# Patient Record
Sex: MALE | Race: OTHER | Employment: OTHER | URBAN - METROPOLITAN AREA
[De-identification: names, ages, dates, MRNs, and addresses within clinical notes are randomized per-mention and may not be internally consistent; named-entity substitution may affect disease eponyms.]

---

## 2022-11-25 ENCOUNTER — HOSPITAL ENCOUNTER (EMERGENCY)
Facility: HOSPITAL | Age: 72
Discharge: HOME | End: 2022-11-25
Attending: EMERGENCY MEDICINE
Payer: MEDICARE

## 2022-11-25 VITALS
HEART RATE: 103 BPM | TEMPERATURE: 100 F | OXYGEN SATURATION: 97 % | WEIGHT: 150 LBS | DIASTOLIC BLOOD PRESSURE: 80 MMHG | RESPIRATION RATE: 16 BRPM | SYSTOLIC BLOOD PRESSURE: 148 MMHG

## 2022-11-25 DIAGNOSIS — R73.9 HYPERGLYCEMIA: ICD-10-CM

## 2022-11-25 DIAGNOSIS — J10.1 INFLUENZA A: Primary | ICD-10-CM

## 2022-11-25 LAB
ANION GAP SERPL CALC-SCNC: 10 MMOL/L (ref 0–18)
BASOPHILS # BLD AUTO: 0.01 X10(3) UL (ref 0–0.2)
BASOPHILS NFR BLD AUTO: 0.1 %
BUN BLD-MCNC: 20 MG/DL (ref 7–18)
BUN/CREAT SERPL: 16.3 (ref 10–20)
CALCIUM BLD-MCNC: 8.6 MG/DL (ref 8.5–10.1)
CHLORIDE SERPL-SCNC: 96 MMOL/L (ref 98–112)
CO2 SERPL-SCNC: 24 MMOL/L (ref 21–32)
CREAT BLD-MCNC: 1.23 MG/DL
DEPRECATED RDW RBC AUTO: 37.3 FL (ref 35.1–46.3)
EOSINOPHIL # BLD AUTO: 0 X10(3) UL (ref 0–0.7)
EOSINOPHIL NFR BLD AUTO: 0 %
ERYTHROCYTE [DISTWIDTH] IN BLOOD BY AUTOMATED COUNT: 12.2 % (ref 11–15)
FLUAV + FLUBV RNA SPEC NAA+PROBE: NEGATIVE
FLUAV + FLUBV RNA SPEC NAA+PROBE: POSITIVE
GFR SERPLBLD BASED ON 1.73 SQ M-ARVRAT: 63 ML/MIN/1.73M2 (ref 60–?)
GLUCOSE BLD-MCNC: 235 MG/DL (ref 70–99)
HCT VFR BLD AUTO: 35.5 %
HGB BLD-MCNC: 12.8 G/DL
IMM GRANULOCYTES # BLD AUTO: 0.02 X10(3) UL (ref 0–1)
IMM GRANULOCYTES NFR BLD: 0.2 %
LYMPHOCYTES # BLD AUTO: 0.78 X10(3) UL (ref 1–4)
LYMPHOCYTES NFR BLD AUTO: 9.3 %
MCH RBC QN AUTO: 30.5 PG (ref 26–34)
MCHC RBC AUTO-ENTMCNC: 36.1 G/DL (ref 31–37)
MCV RBC AUTO: 84.7 FL
MONOCYTES # BLD AUTO: 0.81 X10(3) UL (ref 0.1–1)
MONOCYTES NFR BLD AUTO: 9.7 %
NEUTROPHILS # BLD AUTO: 6.76 X10 (3) UL (ref 1.5–7.7)
NEUTROPHILS # BLD AUTO: 6.76 X10(3) UL (ref 1.5–7.7)
NEUTROPHILS NFR BLD AUTO: 80.7 %
OSMOLALITY SERPL CALC.SUM OF ELEC: 280 MOSM/KG (ref 275–295)
PLATELET # BLD AUTO: 225 10(3)UL (ref 150–450)
POTASSIUM SERPL-SCNC: 3.5 MMOL/L (ref 3.5–5.1)
RBC # BLD AUTO: 4.19 X10(6)UL
RSV RNA SPEC NAA+PROBE: NEGATIVE
SARS-COV-2 RNA RESP QL NAA+PROBE: NOT DETECTED
SODIUM SERPL-SCNC: 130 MMOL/L (ref 136–145)
WBC # BLD AUTO: 8.4 X10(3) UL (ref 4–11)

## 2022-11-25 PROCEDURE — 0241U SARS-COV-2/FLU A AND B/RSV BY PCR (GENEXPERT): CPT | Performed by: EMERGENCY MEDICINE

## 2022-11-25 PROCEDURE — 99283 EMERGENCY DEPT VISIT LOW MDM: CPT

## 2022-11-25 PROCEDURE — 85025 COMPLETE CBC W/AUTO DIFF WBC: CPT | Performed by: EMERGENCY MEDICINE

## 2022-11-25 PROCEDURE — 36415 COLL VENOUS BLD VENIPUNCTURE: CPT

## 2022-11-25 PROCEDURE — 80048 BASIC METABOLIC PNL TOTAL CA: CPT | Performed by: EMERGENCY MEDICINE

## 2022-11-25 RX ORDER — LATANOPROST 50 UG/ML
SOLUTION/ DROPS OPHTHALMIC NIGHTLY
COMMUNITY

## 2022-11-25 RX ORDER — FLUOXETINE HYDROCHLORIDE 20 MG/1
20 CAPSULE ORAL DAILY
COMMUNITY

## 2022-11-25 RX ORDER — ROSUVASTATIN CALCIUM 20 MG/1
20 TABLET, COATED ORAL NIGHTLY
COMMUNITY

## 2022-11-25 RX ORDER — GLIPIZIDE 5 MG/1
5 TABLET ORAL
COMMUNITY

## 2022-11-26 NOTE — ED QUICK NOTES
Patient to ED with Son (who is MD) for complaints of subjective fever of 103 earlier, general malaise. Afebrile on arrival. Swabbed for genex-pending. Spo2 and rest of vital wnl. Provided with water per md approval. Son at bedside.  Call light within reach

## 2025-04-21 ENCOUNTER — LAB ENCOUNTER (OUTPATIENT)
Dept: LAB | Facility: HOSPITAL | Age: 75
End: 2025-04-21
Attending: INTERNAL MEDICINE
Payer: MEDICARE

## 2025-04-21 DIAGNOSIS — E78.5 HYPERLIPIDEMIA: Primary | ICD-10-CM

## 2025-04-21 PROCEDURE — 36415 COLL VENOUS BLD VENIPUNCTURE: CPT

## 2025-04-21 PROCEDURE — 82172 ASSAY OF APOLIPOPROTEIN: CPT

## 2025-04-21 PROCEDURE — 83695 ASSAY OF LIPOPROTEIN(A): CPT

## 2025-04-23 LAB — LIPOPROTEIN (A): 231.4 NMOL/L

## 2025-04-25 LAB — APOLIPOPROTEIN B: 59 MG/DL

## 2025-05-06 RX ORDER — PAROXETINE 10 MG/1
10 TABLET, FILM COATED ORAL EVERY MORNING
COMMUNITY
Start: 2024-10-03 | End: 2025-05-15

## 2025-05-06 RX ORDER — LOSARTAN POTASSIUM 100 MG/1
100 TABLET ORAL DAILY
COMMUNITY
Start: 2025-04-21

## 2025-05-06 RX ORDER — TESTOSTERONE CYPIONATE 200 MG/ML
230 INJECTION, SOLUTION INTRAMUSCULAR
COMMUNITY
Start: 2024-12-31

## 2025-05-06 RX ORDER — VITAMIN B COMPLEX
1000 TABLET ORAL DAILY
COMMUNITY
Start: 2025-02-07

## 2025-05-06 RX ORDER — ASPIRIN 81 MG/1
81 TABLET ORAL DAILY
COMMUNITY

## 2025-05-07 ENCOUNTER — LAB ENCOUNTER (OUTPATIENT)
Dept: LAB | Facility: HOSPITAL | Age: 75
End: 2025-05-07
Attending: INTERNAL MEDICINE
Payer: MEDICARE

## 2025-05-07 DIAGNOSIS — Z01.818 PRE-OP TESTING: ICD-10-CM

## 2025-05-07 LAB
ANION GAP SERPL CALC-SCNC: 5 MMOL/L (ref 0–18)
BUN BLD-MCNC: 10 MG/DL (ref 9–23)
BUN/CREAT SERPL: 9.3 (ref 10–20)
CALCIUM BLD-MCNC: 9.2 MG/DL (ref 8.7–10.4)
CHLORIDE SERPL-SCNC: 97 MMOL/L (ref 98–112)
CO2 SERPL-SCNC: 27 MMOL/L (ref 21–32)
CREAT BLD-MCNC: 1.08 MG/DL (ref 0.7–1.3)
DEPRECATED RDW RBC AUTO: 36.1 FL (ref 35.1–46.3)
EGFRCR SERPLBLD CKD-EPI 2021: 72 ML/MIN/1.73M2 (ref 60–?)
ERYTHROCYTE [DISTWIDTH] IN BLOOD BY AUTOMATED COUNT: 11.9 % (ref 11–15)
FASTING STATUS PATIENT QL REPORTED: YES
GLUCOSE BLD-MCNC: 142 MG/DL (ref 70–99)
HCT VFR BLD AUTO: 34.8 % (ref 39–53)
HGB BLD-MCNC: 12.9 G/DL (ref 13–17.5)
MCH RBC QN AUTO: 30.6 PG (ref 26–34)
MCHC RBC AUTO-ENTMCNC: 37.1 G/DL (ref 31–37)
MCV RBC AUTO: 82.7 FL (ref 80–100)
OSMOLALITY SERPL CALC.SUM OF ELEC: 269 MOSM/KG (ref 275–295)
PLATELET # BLD AUTO: 322 10(3)UL (ref 150–450)
POTASSIUM SERPL-SCNC: 5 MMOL/L (ref 3.5–5.1)
RBC # BLD AUTO: 4.21 X10(6)UL (ref 3.8–5.8)
SODIUM SERPL-SCNC: 129 MMOL/L (ref 136–145)
WBC # BLD AUTO: 8.2 X10(3) UL (ref 4–11)

## 2025-05-07 PROCEDURE — 36415 COLL VENOUS BLD VENIPUNCTURE: CPT

## 2025-05-07 PROCEDURE — 80048 BASIC METABOLIC PNL TOTAL CA: CPT

## 2025-05-07 PROCEDURE — 85027 COMPLETE CBC AUTOMATED: CPT

## 2025-05-09 ENCOUNTER — HOSPITAL ENCOUNTER (OUTPATIENT)
Dept: INTERVENTIONAL RADIOLOGY/VASCULAR | Facility: HOSPITAL | Age: 75
Discharge: HOME | End: 2025-05-09
Attending: INTERNAL MEDICINE | Admitting: INTERNAL MEDICINE
Payer: MEDICARE

## 2025-05-09 VITALS
HEIGHT: 63 IN | BODY MASS INDEX: 22.18 KG/M2 | HEART RATE: 84 BPM | DIASTOLIC BLOOD PRESSURE: 65 MMHG | TEMPERATURE: 97 F | SYSTOLIC BLOOD PRESSURE: 149 MMHG | WEIGHT: 125.19 LBS | RESPIRATION RATE: 19 BRPM | OXYGEN SATURATION: 95 %

## 2025-05-09 DIAGNOSIS — Z01.818 PRE-OP TESTING: Primary | ICD-10-CM

## 2025-05-09 DIAGNOSIS — I50.20 HFREF (HEART FAILURE WITH REDUCED EJECTION FRACTION) (HCC): ICD-10-CM

## 2025-05-09 DIAGNOSIS — R94.39 ABNORMAL NUCLEAR STRESS TEST: ICD-10-CM

## 2025-05-09 LAB
ANION GAP SERPL CALC-SCNC: 6 MMOL/L (ref 0–18)
BUN BLD-MCNC: 10 MG/DL (ref 9–23)
BUN/CREAT SERPL: 9.2 (ref 10–20)
CALCIUM BLD-MCNC: 8.7 MG/DL (ref 8.7–10.4)
CHLORIDE SERPL-SCNC: 103 MMOL/L (ref 98–112)
CO2 SERPL-SCNC: 26 MMOL/L (ref 21–32)
CREAT BLD-MCNC: 1.09 MG/DL (ref 0.7–1.3)
EGFRCR SERPLBLD CKD-EPI 2021: 72 ML/MIN/1.73M2 (ref 60–?)
FASTING STATUS PATIENT QL REPORTED: YES
GLUCOSE BLD-MCNC: 136 MG/DL (ref 70–99)
GLUCOSE BLDC GLUCOMTR-MCNC: 140 MG/DL (ref 70–99)
OSMOLALITY SERPL CALC.SUM OF ELEC: 281 MOSM/KG (ref 275–295)
POTASSIUM SERPL-SCNC: 4.8 MMOL/L (ref 3.5–5.1)
SODIUM SERPL-SCNC: 135 MMOL/L (ref 136–145)

## 2025-05-09 PROCEDURE — 82962 GLUCOSE BLOOD TEST: CPT

## 2025-05-09 PROCEDURE — 80048 BASIC METABOLIC PNL TOTAL CA: CPT | Performed by: INTERNAL MEDICINE

## 2025-05-09 PROCEDURE — 36415 COLL VENOUS BLD VENIPUNCTURE: CPT

## 2025-05-09 PROCEDURE — 93458 L HRT ARTERY/VENTRICLE ANGIO: CPT | Performed by: INTERNAL MEDICINE

## 2025-05-09 PROCEDURE — 99152 MOD SED SAME PHYS/QHP 5/>YRS: CPT | Performed by: INTERNAL MEDICINE

## 2025-05-09 RX ORDER — CHLORHEXIDINE GLUCONATE 40 MG/ML
SOLUTION TOPICAL
Status: DISCONTINUED | OUTPATIENT
Start: 2025-05-09 | End: 2025-05-09

## 2025-05-09 RX ORDER — IOPAMIDOL 612 MG/ML
20 INJECTION, SOLUTION INTRAVASCULAR
Status: COMPLETED | OUTPATIENT
Start: 2025-05-09 | End: 2025-05-09

## 2025-05-09 RX ORDER — MIDAZOLAM HYDROCHLORIDE 1 MG/ML
INJECTION INTRAMUSCULAR; INTRAVENOUS
Status: COMPLETED
Start: 2025-05-09 | End: 2025-05-09

## 2025-05-09 RX ORDER — MIDAZOLAM HYDROCHLORIDE 1 MG/ML
INJECTION INTRAMUSCULAR; INTRAVENOUS
Status: DISCONTINUED
Start: 2025-05-09 | End: 2025-05-09 | Stop reason: WASHOUT

## 2025-05-09 RX ORDER — NITROGLYCERIN 20 MG/100ML
INJECTION INTRAVENOUS
Status: COMPLETED
Start: 2025-05-09 | End: 2025-05-09

## 2025-05-09 RX ORDER — LIDOCAINE HYDROCHLORIDE 20 MG/ML
INJECTION, SOLUTION EPIDURAL; INFILTRATION; INTRACAUDAL; PERINEURAL
Status: COMPLETED
Start: 2025-05-09 | End: 2025-05-09

## 2025-05-09 RX ORDER — CLOPIDOGREL BISULFATE 75 MG/1
75 TABLET ORAL DAILY
Qty: 30 TABLET | Refills: 0 | Status: SHIPPED | OUTPATIENT
Start: 2025-05-09

## 2025-05-09 RX ORDER — CLOPIDOGREL BISULFATE 75 MG/1
TABLET ORAL
Status: COMPLETED
Start: 2025-05-09 | End: 2025-05-09

## 2025-05-09 RX ORDER — VERAPAMIL HYDROCHLORIDE 2.5 MG/ML
INJECTION INTRAVENOUS
Status: COMPLETED
Start: 2025-05-09 | End: 2025-05-09

## 2025-05-09 RX ORDER — CLOPIDOGREL BISULFATE 75 MG/1
600 TABLET ORAL ONCE
Status: COMPLETED | OUTPATIENT
Start: 2025-05-09 | End: 2025-05-09

## 2025-05-09 RX ORDER — HEPARIN SODIUM 1000 [USP'U]/ML
INJECTION, SOLUTION INTRAVENOUS; SUBCUTANEOUS
Status: COMPLETED
Start: 2025-05-09 | End: 2025-05-09

## 2025-05-09 RX ORDER — SODIUM CHLORIDE 9 MG/ML
3 INJECTION, SOLUTION INTRAVENOUS
Status: COMPLETED | OUTPATIENT
Start: 2025-05-09 | End: 2025-05-09

## 2025-05-09 RX ORDER — ASPIRIN 81 MG/1
324 TABLET, CHEWABLE ORAL ONCE
Status: DISCONTINUED | OUTPATIENT
Start: 2025-05-09 | End: 2025-05-09

## 2025-05-09 RX ADMIN — SODIUM CHLORIDE 3 ML/KG/HR: 9 INJECTION, SOLUTION INTRAVENOUS at 06:45:00

## 2025-05-09 RX ADMIN — CLOPIDOGREL BISULFATE 600 MG: 75 TABLET ORAL at 11:22:00

## 2025-05-09 RX ADMIN — IOPAMIDOL 20 ML: 612 INJECTION, SOLUTION INTRAVASCULAR at 08:22:00

## 2025-05-09 NOTE — CONSULTS
[facility]  part of [facility]  [facility]  Report of Consultation    Nic Leon Patient Status:  Outpatient    1950 MRN [id number]   Location [facility] INTERVENTIONAL SUITES Attending Francisco Lockhart MD   Hosp Day # 0 PCP No primary care provider on file.     Date of Consult:  2025    Reason for Consultation:  Multivessel coronary artery disease    History of Present Illness:  Nic Leon is a a(n) 74 year old male.   History of hypertension hyperlipidemia and diabetes mellitus.  Denies any symptoms of chest pain, chest pressure, back pain, nausea vomiting, shortness of breath or dyspnea on exertion.  No issues with orthopnea or edema.  Has hypertension but being treated with medication.  He had a stress test ordered and this was found to be abnormal.  This prompted referral to cardiology.  Echocardiogram was found to have a normal ejection fraction with no wall motion abnormalities however mild to moderate aortic stenosis.  Given abnormal stress test, angiogram was recommended and this was performed today.  Found to have severely diffuse disease and multivessel coronary artery disease, most notably is an 8090% lesion in the proximal left anterior descending artery which is a nice target however, he has chronic total occlusions of the circumflex and right coronary artery distributions with collateral filling of very small and faint collateral vessels.  We are asked to evaluate for surgical vascularization options.  He is here with his daughter and his son who is a neurologist here at [facility].  The patient himself has a PhD in microbiology.      History:  Past Medical History[1]  Past Surgical History[2]  Family History[3]   reports that he has been smoking cigarettes. He has a 80 pack-year smoking history. He uses smokeless tobacco.    Allergies:  Allergies[4]    Medications:  Current Hospital Medications[5]    Review of  Systems:  Pertinent items are noted in HPI.    Physical Exam:  Blood pressure 134/59, pulse 73, temperature 97.2 °F (36.2 °C), resp. rate 16, height 5' 3\" (1.6 m), weight 125 lb 3.2 oz (56.8 kg), SpO2 95%.    GENERAL: No acute distress.  VITAL SIGNS: See above.  HEENT: Trachea midline.  No jugular venous distention.  No carotid bruit.  CHEST: Chest wall is nontender.  HEART: Regular rate and rhythm without murmurs.  LUNGS: Clear to auscultation bilaterally.  ABDOMEN: Soft, nontender nondistended.  VASCULAR: Palpable radial artery pulses 2+ bilateral.  SKIN: No rash, no excessive bruising, petechiae, or purpura.  NEUROLOGIC: Cranial nerves II-XII intact without motor/sensory deficit.      Laboratory Data:  Creatinine 1.09    Left Ventriculography and hemodynamics:   LV EF not done  LV EDP 15 mmHg  Mean invasive gradient 15 mmHg     Coronary Angiography  RCA: Codominant, 100% occluded in the midsegment with faint right to right collaterals, faint left-to-right collaterals of unclear origin, likely high bifurcating PDA and PL system.     Left main:  Free of obstructive disease     Left anterior descending: Heavily calcified 90% proximal LAD stenosis     Circumflex: 100% occluded in the proximal to ostial segment, heavily calcified, faint left to left collaterals, of unclear origin.    Impression and Plan:  74-year-old male with abnormal stress test, multivessel coronary artery disease.    Angiogram is reviewed.  Appears to have reasonable target in the left anterior descending artery distribution however his circumflex and right coronary arteries are essentially occluded and there is filling of branch vessels from faint right to right and left-to-right collaterals.  There appears to be an obtuse marginal artery that fills in later however there is significant calcium burden in this vessel throughout its portion until it gives off a small bifurcating branch seen on the collateral vessels.  After review the angiogram, I  believe the left anterior sending artery is a reasonable target however I believe it is very unlikely that there are bypassable vessels in the obtuse marginal and right coronary artery distributions.  I explained to the patient and his family that the only way to really determine this would be to perform open heart surgery and plan for the left intramammary artery to left anterior descending artery and then assessment of the remainder of the circulation to see if there are any bypassable targets, however I believe there is a very reasonable likelihood that there are not bypassable vessels available or present in the right coronary artery or circumflex distributions.  We had a long discussion regarding percutaneous treatment options of the left anterior sending artery versus single-vessel LIMA to LAD.  We discussed the surgery in detail as it would require sternotomy with general anesthesia, cardiac arrest and cardiopulmonary bypass.    We discussed conduit selection, long term patency rates of mammary artery and reverse saphenous vein.  We discussed benefits. We discussed risks including but not limited to: bleeding, coagulopathy, transfusion (to which he agrees to accept after discussing risks of transfusion), infection, sternal dehiscence, prolonged ventilation, myocardial infarction, stroke, arrhythmia, atrial fibrillation, kidney injury, dialysis, multisystem organ dysfunction, imponderables and death.      We also did discuss his aortic valve being only mild to moderately stenotic, mean invasive gradient 15mmHg, would likely not offer valve intervention concomitantly with his bypass surgery.    At this point, patient and family wish to consider their options over the weekend.  They will consider pursuing heart surgery versus stenting of his coronary disease.  Alternatively I did offer him to obtain a second opinion at [facility] for consideration of open heart surgery.  They will consider  their options and reach out to us early next week to let us know how they would like to proceed.  I did discuss the case also with Dr. Lockhart.    Syed Navarrete MD  Cardiothoracic Surgery  [facility]       Time spent on counseling/coordination of care:  43009- 80 min    Total time spent with patient:  1 Hour    Syed Navarrete MD  5/9/2025  11:12 AM       [1]   Past Medical History:   Diabetes (HCC)    Hyperlipidemia   [2] No past surgical history on file.  [3] No family history on file.  [4] No Known Allergies  [5]   Current Facility-Administered Medications:     chlorhexidine (Hibiclens) 4 % external liquid, , Topical, On Call    aspirin chewable tab 324 mg, 324 mg, Oral, Once    clopidogrel (Plavix) tab 600 mg, 600 mg, Oral, Daily

## 2025-05-09 NOTE — PROCEDURES
[facility]  part of [facility]    Cardiac Cath Procedure Note  Nic Leon Patient Status:  Outpatient    1950 MRN [id number]   Location [facility] INTERVENTIONAL SUITES Attending Francisco Lockhart MD   Hosp Day # 0 PCP No primary care provider on file.       Cardiologist: Francisco Lockhart MD  Primary Proceduralist: Francisco Lockhart MD  Procedure Performed: LHC and LV  Date of Procedure: 2025   Indication: Abnormal stress test    Summary of procedure:    Multivessel coronary disease:  100% occluded RCA and circumflex, 95% proximal LAD stenosis.        Recommendations:  CV surgery consult for bypass and SAVR  Hyponatremia likely lab error  Bicuspid aortic valve: Preserved ejection fraction with moderate stenosis, mild regurgitation    Left Ventriculography and hemodynamics:   LV EF not done  LV EDP 15 mmHg  Mean invasive gradient 15 mmHg        Coronary Angiography  RCA: Codominant, 100% occluded in the midsegment with faint right to right collaterals, faint left-to-right collaterals of unclear origin, likely high bifurcating PDA and PL system.    Left main:  Free of obstructive disease    Left anterior descending: Heavily calcified 90% proximal LAD stenosis    Circumflex: 100% occluded in the proximal to ostial segment, heavily calcified, faint left to left collaterals, of unclear origin.        Summary of Case: After written informed consent was obtained from the patient, patient was brought to the cardiac catheterization laboratory.  Patient was prepped and draped in the usual sterile fashion. Lidocaine 1% was used to infiltrate the right radial artery for local anesthesia and a 6 Rwandan introducer sheath was inserted into the right radial artery.      Selective coronary angiography performed with JR4 catheter for RCA and JL3.5 catheter for LCA.  Angiography performed in standard projections.      6 Mongolian JR4 catheter placed in LV for hemodynamics.    Specimen sent to: No  specimen collected  Estimated blood loss: 10 cc  Closure:  TR band      IV was maintained by RN and moderate conscious sedation of versed and fentanyl was given.  Patient was assessed and monitoring of oxygen, heart rate and blood pressure by nurse and myself during the exam 35 minutes.      Francisco Lockhart MD  05/09/25

## 2025-05-09 NOTE — DISCHARGE INSTRUCTIONS
TRANSRADIAL ANGIOGRAM DISCHARGE INSTRUCTIONS AND HOME CARE    Activity    DO NOT drive after the procedure.  You may resume driving late the following day according to the nurse or physician's instructions  Plan on resting and relaxing tonight and tomorrow  Resume your normal activity after 48 hours, or as instructed by your physician  Avoid drinking alcohol for the next 24 hours  Avoid wrist flexion, extension, and fine motor activities (i.e. texting, typing, using computer mouse, etc.) for 24 hours.  The armboard will help remind you not to do these motions.   Do not lift or pull anything heavier than 5 to 8 pounds and avoid pushing up with the affected hand for 1 week    What is Normal?    A small lump at the procedure site associated with mild tenderness when touched  The procedure site may be bruised or discolored  There may be a small amount of drainage on the bandage    Special Instructions     Drink plenty of fluids during the next 24 hours to \"flush\" the contrast from your system  Keep the bandage clean and dry  After 24 hours, you remove the bandage and armboard.  Remove the dressing and armboard tomorrow anytime after ___1100 am_.  Do not put ointment, powders, or creams to site.   You can shower after removing the bandage, and wash the procedure site gently with soap and water  DO NOT submerge the procedure site for 1 week (no bath tubs, pools or washing dishes)  If you choose to wear a bandage for a few days, make sure it remains clean and dry and that it is changed daily  For local swelling: apply ice  Bleeding can occur at the procedure site - both on the outside of the skin and/or beneath the surface of the skin        If bleeding occurs:   Elevate hand above heart and apply pressure to the procedure site with 2-3 fingers, as instructed by the nurse.  Hold pressure for 20 minutes and the bleeding should stop.  Notify your physician of the occurrence.  If the bleeding does not stop, call 911 and  continue to apply pressure    Additional Instructions:  Do not take glucophage/metformin containing products for at least 48 hours after procedure, unless otherwise directed by your physician.    When to contact physician: Call                                [phone]                         right away if you experience     Increased swelling or a large lump, pain or bleeding at the site that is not relieved by applying ice or pressure  Signs of infection: Redness, warmth, drainage at the site, chills, or temperature of 100.5 or greater  Changes in sensation, numbness, or tingling of affected hand      Other    You may resume your present diet, unless otherwise specified by your physician.    You may resume all of your medications as prescribed, unless otherwise directed by your physician.  A list of your medications was provided to you at discharge.  Gradually resume your previous aerobic exercise schedule as directed by your physician.      If you have any question or concern, please call the on-call nurse at [phone]

## 2025-05-09 NOTE — IVS NOTE
DISCHARGE NOTE     Pt is able to sit up and ambulate without difficulty.   Pt voided and tolerated fluids and food.   Procedural site remains dry and intact with good circulation, motion and sensation.   No signs or symptoms of bleeding/hematoma noted.   Pt denies any pain or discomfort at this time.  IV access removed  Instructions provided, patient/family verbalizes understanding.   Dr. Lockhart spoke with patient/family post procedure.     [facility]       Follow up Appointment: 5/20    New Prescription: plavix 75mg daily

## 2025-05-09 NOTE — INTERVAL H&P NOTE
Pre-op Diagnosis: * No pre-op diagnosis entered *    The above referenced H&P was reviewed by Francisco Lockhart MD on 5/9/2025, the patient was examined and no significant changes have occurred in the patient's condition since the H&P was performed.  I discussed with the patient and/or legal representative the potential benefits, risks and side effects of this procedure; the likelihood of the patient achieving goals; and potential problems that might occur during recuperation.  I discussed reasonable alternatives to the procedure, including risks, benefits and side effects related to the alternatives and risks related to not receiving this procedure.  We will proceed with procedure as planned.

## 2025-05-11 ENCOUNTER — LAB ENCOUNTER (OUTPATIENT)
Dept: LAB | Facility: HOSPITAL | Age: 75
End: 2025-05-11
Attending: PHYSICIAN ASSISTANT
Payer: MEDICARE

## 2025-05-11 DIAGNOSIS — Z01.812 PRE-OPERATIVE LABORATORY EXAMINATION: ICD-10-CM

## 2025-05-11 DIAGNOSIS — Z01.818 PREOP EXAMINATION: ICD-10-CM

## 2025-05-11 DIAGNOSIS — I10 ESSENTIAL HYPERTENSION, MALIGNANT: Primary | ICD-10-CM

## 2025-05-11 LAB
ANION GAP SERPL CALC-SCNC: 5 MMOL/L (ref 0–18)
BASOPHILS # BLD AUTO: 0.05 X10(3) UL (ref 0–0.2)
BASOPHILS NFR BLD AUTO: 0.8 %
BUN BLD-MCNC: 10 MG/DL (ref 9–23)
BUN/CREAT SERPL: 8.7 (ref 10–20)
CALCIUM BLD-MCNC: 8.6 MG/DL (ref 8.7–10.4)
CHLORIDE SERPL-SCNC: 100 MMOL/L (ref 98–112)
CO2 SERPL-SCNC: 25 MMOL/L (ref 21–32)
CREAT BLD-MCNC: 1.15 MG/DL (ref 0.7–1.3)
DEPRECATED RDW RBC AUTO: 38.8 FL (ref 35.1–46.3)
EGFRCR SERPLBLD CKD-EPI 2021: 67 ML/MIN/1.73M2 (ref 60–?)
EOSINOPHIL # BLD AUTO: 0.15 X10(3) UL (ref 0–0.7)
EOSINOPHIL NFR BLD AUTO: 2.3 %
ERYTHROCYTE [DISTWIDTH] IN BLOOD BY AUTOMATED COUNT: 11.9 % (ref 11–15)
FASTING STATUS PATIENT QL REPORTED: YES
GLUCOSE BLD-MCNC: 305 MG/DL (ref 70–99)
HCT VFR BLD AUTO: 33 % (ref 39–53)
HGB BLD-MCNC: 11.6 G/DL (ref 13–17.5)
IMM GRANULOCYTES # BLD AUTO: 0.02 X10(3) UL (ref 0–1)
IMM GRANULOCYTES NFR BLD: 0.3 %
LYMPHOCYTES # BLD AUTO: 1.33 X10(3) UL (ref 1–4)
LYMPHOCYTES NFR BLD AUTO: 20.2 %
MCH RBC QN AUTO: 30.9 PG (ref 26–34)
MCHC RBC AUTO-ENTMCNC: 35.2 G/DL (ref 31–37)
MCV RBC AUTO: 87.8 FL (ref 80–100)
MONOCYTES # BLD AUTO: 0.52 X10(3) UL (ref 0.1–1)
MONOCYTES NFR BLD AUTO: 7.9 %
NEUTROPHILS # BLD AUTO: 4.53 X10 (3) UL (ref 1.5–7.7)
NEUTROPHILS # BLD AUTO: 4.53 X10(3) UL (ref 1.5–7.7)
NEUTROPHILS NFR BLD AUTO: 68.5 %
OSMOLALITY SERPL CALC.SUM OF ELEC: 281 MOSM/KG (ref 275–295)
PLATELET # BLD AUTO: 276 10(3)UL (ref 150–450)
POTASSIUM SERPL-SCNC: 4.5 MMOL/L (ref 3.5–5.1)
RBC # BLD AUTO: 3.76 X10(6)UL (ref 3.8–5.8)
SODIUM SERPL-SCNC: 130 MMOL/L (ref 136–145)
WBC # BLD AUTO: 6.6 X10(3) UL (ref 4–11)

## 2025-05-11 PROCEDURE — 36415 COLL VENOUS BLD VENIPUNCTURE: CPT

## 2025-05-11 PROCEDURE — 80048 BASIC METABOLIC PNL TOTAL CA: CPT

## 2025-05-11 PROCEDURE — 85025 COMPLETE CBC W/AUTO DIFF WBC: CPT

## 2025-05-13 ENCOUNTER — HOSPITAL ENCOUNTER (OUTPATIENT)
Dept: INTERVENTIONAL RADIOLOGY/VASCULAR | Facility: HOSPITAL | Age: 75
Discharge: HOME | End: 2025-05-15
Attending: INTERNAL MEDICINE | Admitting: INTERNAL MEDICINE
Payer: MEDICARE

## 2025-05-13 ENCOUNTER — APPOINTMENT (OUTPATIENT)
Dept: CV DIAGNOSTICS | Facility: HOSPITAL | Age: 75
End: 2025-05-13
Attending: INTERNAL MEDICINE
Payer: MEDICARE

## 2025-05-13 ENCOUNTER — APPOINTMENT (OUTPATIENT)
Dept: ULTRASOUND IMAGING | Facility: HOSPITAL | Age: 75
End: 2025-05-13
Attending: INTERNAL MEDICINE
Payer: MEDICARE

## 2025-05-13 DIAGNOSIS — I25.10 CAD (CORONARY ARTERY DISEASE): ICD-10-CM

## 2025-05-13 DIAGNOSIS — D64.89 ANEMIA DUE TO OTHER CAUSE, NOT CLASSIFIED: Primary | ICD-10-CM

## 2025-05-13 LAB
ANION GAP SERPL CALC-SCNC: 6 MMOL/L (ref 0–18)
BUN BLD-MCNC: 12 MG/DL (ref 9–23)
BUN/CREAT SERPL: 10.7 (ref 10–20)
CALCIUM BLD-MCNC: 8.8 MG/DL (ref 8.7–10.4)
CHLORIDE SERPL-SCNC: 105 MMOL/L (ref 98–112)
CO2 SERPL-SCNC: 24 MMOL/L (ref 21–32)
CREAT BLD-MCNC: 1.12 MG/DL (ref 0.7–1.3)
EGFRCR SERPLBLD CKD-EPI 2021: 69 ML/MIN/1.73M2 (ref 60–?)
FASTING STATUS PATIENT QL REPORTED: YES
GLUCOSE BLD-MCNC: 135 MG/DL (ref 70–99)
GLUCOSE BLDC GLUCOMTR-MCNC: 143 MG/DL (ref 70–99)
GLUCOSE BLDC GLUCOMTR-MCNC: 265 MG/DL (ref 70–99)
GLUCOSE BLDC GLUCOMTR-MCNC: 336 MG/DL (ref 70–99)
ISTAT ACTIVATED CLOTTING TIME: 256 SECONDS (ref 125–137)
OSMOLALITY SERPL CALC.SUM OF ELEC: 282 MOSM/KG (ref 275–295)
POTASSIUM SERPL-SCNC: 4.5 MMOL/L (ref 3.5–5.1)
SODIUM SERPL-SCNC: 135 MMOL/L (ref 136–145)

## 2025-05-13 PROCEDURE — 76882 US LMTD JT/FCL EVL NVASC XTR: CPT | Performed by: INTERNAL MEDICINE

## 2025-05-13 PROCEDURE — 99203 OFFICE O/P NEW LOW 30 MIN: CPT | Performed by: HOSPITALIST

## 2025-05-13 PROCEDURE — 93307 TTE W/O DOPPLER COMPLETE: CPT | Performed by: INTERNAL MEDICINE

## 2025-05-13 RX ORDER — CLOPIDOGREL BISULFATE 75 MG/1
75 TABLET ORAL DAILY
Status: DISCONTINUED | OUTPATIENT
Start: 2025-05-14 | End: 2025-05-15

## 2025-05-13 RX ORDER — ACETAMINOPHEN 325 MG/1
650 TABLET ORAL EVERY 6 HOURS PRN
Status: DISCONTINUED | OUTPATIENT
Start: 2025-05-13 | End: 2025-05-15

## 2025-05-13 RX ORDER — CLOPIDOGREL BISULFATE 75 MG/1
TABLET ORAL
Status: COMPLETED
Start: 2025-05-13 | End: 2025-05-13

## 2025-05-13 RX ORDER — DEXTROSE MONOHYDRATE 25 G/50ML
50 INJECTION, SOLUTION INTRAVENOUS
Status: DISCONTINUED | OUTPATIENT
Start: 2025-05-13 | End: 2025-05-15

## 2025-05-13 RX ORDER — ASPIRIN 81 MG/1
81 TABLET ORAL DAILY
Status: DISCONTINUED | OUTPATIENT
Start: 2025-05-14 | End: 2025-05-15

## 2025-05-13 RX ORDER — HYDROCODONE BITARTRATE AND ACETAMINOPHEN 5; 325 MG/1; MG/1
1 TABLET ORAL EVERY 6 HOURS PRN
Refills: 0 | Status: DISCONTINUED | OUTPATIENT
Start: 2025-05-13 | End: 2025-05-15

## 2025-05-13 RX ORDER — SODIUM CHLORIDE 9 MG/ML
3 INJECTION, SOLUTION INTRAVENOUS
Status: COMPLETED | OUTPATIENT
Start: 2025-05-13 | End: 2025-05-13

## 2025-05-13 RX ORDER — NICOTINE POLACRILEX 4 MG
30 LOZENGE BUCCAL
Status: DISCONTINUED | OUTPATIENT
Start: 2025-05-13 | End: 2025-05-15

## 2025-05-13 RX ORDER — LOSARTAN POTASSIUM 100 MG/1
100 TABLET ORAL DAILY
Status: DISCONTINUED | OUTPATIENT
Start: 2025-05-14 | End: 2025-05-15

## 2025-05-13 RX ORDER — IOPAMIDOL 612 MG/ML
35 INJECTION, SOLUTION INTRAVASCULAR
Status: COMPLETED | OUTPATIENT
Start: 2025-05-13 | End: 2025-05-13

## 2025-05-13 RX ORDER — NITROGLYCERIN 20 MG/100ML
INJECTION INTRAVENOUS
Status: COMPLETED
Start: 2025-05-13 | End: 2025-05-13

## 2025-05-13 RX ORDER — MIDAZOLAM HYDROCHLORIDE 1 MG/ML
INJECTION INTRAMUSCULAR; INTRAVENOUS
Status: COMPLETED
Start: 2025-05-13 | End: 2025-05-13

## 2025-05-13 RX ORDER — LATANOPROST 50 UG/ML
1 SOLUTION/ DROPS OPHTHALMIC NIGHTLY
Status: DISCONTINUED | OUTPATIENT
Start: 2025-05-13 | End: 2025-05-15

## 2025-05-13 RX ORDER — NICOTINE POLACRILEX 4 MG
15 LOZENGE BUCCAL
Status: DISCONTINUED | OUTPATIENT
Start: 2025-05-13 | End: 2025-05-15

## 2025-05-13 RX ORDER — ROSUVASTATIN CALCIUM 20 MG/1
20 TABLET, COATED ORAL NIGHTLY
Status: DISCONTINUED | OUTPATIENT
Start: 2025-05-13 | End: 2025-05-15

## 2025-05-13 RX ORDER — SODIUM CHLORIDE 9 MG/ML
INJECTION, SOLUTION INTRAVENOUS CONTINUOUS
Status: ACTIVE | OUTPATIENT
Start: 2025-05-13 | End: 2025-05-13

## 2025-05-13 RX ORDER — CHLORHEXIDINE GLUCONATE 40 MG/ML
SOLUTION TOPICAL
Status: COMPLETED | OUTPATIENT
Start: 2025-05-13 | End: 2025-05-13

## 2025-05-13 RX ORDER — HEPARIN SODIUM 1000 [USP'U]/ML
INJECTION, SOLUTION INTRAVENOUS; SUBCUTANEOUS
Status: COMPLETED
Start: 2025-05-13 | End: 2025-05-13

## 2025-05-13 RX ORDER — LIDOCAINE HYDROCHLORIDE 20 MG/ML
INJECTION, SOLUTION EPIDURAL; INFILTRATION; INTRACAUDAL; PERINEURAL
Status: COMPLETED
Start: 2025-05-13 | End: 2025-05-13

## 2025-05-13 RX ORDER — ASPIRIN 81 MG/1
324 TABLET, CHEWABLE ORAL ONCE
Status: DISCONTINUED | OUTPATIENT
Start: 2025-05-13 | End: 2025-05-14 | Stop reason: ALTCHOICE

## 2025-05-13 RX ADMIN — CHLORHEXIDINE GLUCONATE: 40 SOLUTION TOPICAL at 09:15:00

## 2025-05-13 RX ADMIN — LATANOPROST 1 DROP: 50 SOLUTION/ DROPS OPHTHALMIC at 20:51:00

## 2025-05-13 RX ADMIN — ACETAMINOPHEN 650 MG: 325 TABLET ORAL at 18:33:00

## 2025-05-13 RX ADMIN — IOPAMIDOL 35 ML: 612 INJECTION, SOLUTION INTRAVASCULAR at 11:52:00

## 2025-05-13 RX ADMIN — ROSUVASTATIN CALCIUM 20 MG: 20 TABLET, COATED ORAL at 20:51:00

## 2025-05-13 RX ADMIN — SODIUM CHLORIDE 3 ML/KG/HR: 9 INJECTION, SOLUTION INTRAVENOUS at 09:15:00

## 2025-05-13 NOTE — CONSULTS
[facility]    PATIENT'S NAME: GREGORIA COPELAND   ATTENDING PHYSICIAN: Francisco Lockhart MD   CONSULTING PHYSICIAN: Jonny Tarango MD   PATIENT ACCOUNT#:   [id number]    LOCATION:  [address] [place] [address]  MEDICAL RECORD #:   [id number]       YOB: 1950  ADMISSION DATE:       05/13/2025      CONSULT DATE:  05/13/2025    REPORT OF CONSULTATION      REASON FOR ADMISSION:  Post LAD PCI intervention and right groin hematoma.    HISTORY OF PRESENT ILLNESS:  Patient is a 74-year-old [place]  male with a history of dyspnea on exertion, recent evaluation by Cardiology, had an echocardiogram which showed normal ejection fraction with left ventricular diastolic dysfunction, mild to moderate aortic stenosis with bicuspid aortic valve.  Stress test showed reversible ischemia.  Cardiac angiogram done on May 9 showed 95% proximal LAD and chronic total occlusion of left circumflex and RCA.  Today, brought in for first-stage intervention, LAD proximal PCI stent.  Postoperatively, had right groin hematoma.  Ultrasound showed no evidence of pseudoaneurysm.  Patient will be observed overnight.    PAST MEDICAL HISTORY:  Multivessel coronary artery disease as outlined above; hypertension; hyperlipidemia; diabetes mellitus type 2, noninsulin dependent.    PAST SURGICAL HISTORY:  Abdominal wall laceration repair.    MEDICATIONS:  Please see medication reconciliation list.     ALLERGIES:  No known drug allergies.     SOCIAL HISTORY:  On-and-off tobacco use.  No alcohol use.  Lives with his family.      FAMILY HISTORY:  Positive for coronary artery disease and hypertension.     REVIEW OF SYSTEMS:  Currently resting in bed.  No chest pain.  No shortness of breath.  Other 12-point review of systems is negative.       PHYSICAL EXAMINATION:    GENERAL:  Alert and oriented to time, place and person.  No acute distress.    VITAL SIGNS:  Temperature 97.7, pulse 74, respiratory rate 20, blood pressure 124/70, pulse ox 98% on room air.    HEENT:  Atraumatic.  Oropharynx clear.  Dry mucous membranes.  Ears and nose normal.  Eyes:  Anicteric sclerae.   NECK:  Supple.  No lymphadenopathy.  Trachea midline.    LUNGS:  Clear to auscultation bilaterally.  Normal respiratory effort.   HEART:  Regular rate and rhythm.  S1 and S2 auscultated.  No murmur.    ABDOMEN:  Soft, nondistended.  No tenderness.  Positive bowel sounds.   EXTREMITIES:  Right groin dressing.  Hematoma without fluctuation or swelling.  Dorsalis pedis pulses palpated bilaterally.  NEUROLOGIC:  Motor and sensory intact.    ASSESSMENT:    1.   Coronary artery disease, status post first-stage intervention, left anterior descending PCI stent.  2.   Right groin hematoma.  3.   Diabetes mellitus type 2.     PLAN:  Patient will be admitted to general medical floor.  Hold oral diabetic medications, metformin and glipizide.  Monitor Accu-Cheks.  Continue dual antiplatelet therapy.  Continue home blood pressure medications.  Further recommendations to follow.     Dictated By Jonny Tarango MD  d: 05/13/2025 17:45:44  t: 05/13/2025 17:57:44  [facility] [id number]  /

## 2025-05-13 NOTE — PROCEDURES
[facility]  part of [facility]    Cardiac Cath Procedure Note    Nic Leon Patient Status:  Outpatient    1950 MRN [id number]   Location [facility] INTERVENTIONAL SUITES Attending Francisco Lockhart MD   Hosp Day # 0 PCP No primary care provider on file.       Cardiologist: Francisco Lockhart MD  Primary Proceduralist: Francisco Lockhart MD  Procedure Performed: LHC, COR, and IVUS guided shockwave and PCI proximal LAD  Date of Procedure: 2025   Indication: Staged intervention    Summary of procedure:    Successful IVUS guided shockwave and PCI proximal LAD with CAREY x 1      Recommendations:  Antiplatelet: ASA and Plavix  Statin: On high dose dose statin, no changes  Discharge in 4 hours  Cardiac rehab as outpatient 1 to 2 weeks  Plan  circumflex and RCA PCI       Coronary intervention:  LAD intervention  Lesion Characteristics-not torturous, severely calcified.  Type non-C lesion.  Pre-intervention stenosis 95%, Post intervention stenosis 0%.  Pre JIMBO 3, Post JIMBO 3.      Guide Catheter: EBU 3.75  Wire: Tiffany blue  IVUS demonstrates concentrically calcified lesion, reference vessel diameter obtained  Pre-dil: 3.5 x 20 mm NC balloon which did not fully yield  Shockwave: 4 x 12 mm shockwave balloon, not all treatments given due to hypotension with prolonged inflation  Stent: 4 x 20 mm Synergy CAREY at 12 danae  Post-dil: 4 x 15 mm NC balloon proximal portion of the stent      Description of Procedure:   After written informed consent was obtained from the patient, patient was brought to the cardiac catheterization laboratory.  Patient was prepped and draped in the usual sterile fashion. Lidocaine 1% was used to infiltrate the right groin for local anesthesia and a 6 Ukrainian introducer sheath was inserted into the right femoral artery via ultrasound guidance and micropuncture kit.      Intervention as above    Selective right femoral angiogram done assess anatomy for  closure.      Specimen sent to: No specimen collected  Estimated blood loss: 10 cc  Closure:  Perclose       IV was maintained by RN and moderate conscious sedation of versed and fentanyl was given.  Patient was assessed and monitoring of oxygen, heart rate and blood pressure by nurse and myself during the exam 35 minutes.      Francisco Lockhart MD  05/13/25

## 2025-05-13 NOTE — IVS NOTE
Hand-Off     Procedure hand off report given to Elaina ROCHA.   Pt's vital signs are stable.   Procedural access site is dry and intact with no signs or symptoms of bleeding or hematoma.   Dr. Lockhart spoke with patient/family post procedure.   Stent card provided to patient's dtr.   Cardiac Rehab and Dietitian notified of pts new stent.

## 2025-05-13 NOTE — DIETARY NOTE
NUTRITION EDUCATION NOTE     Received consult for cardiac nutrition education. Verbally reviewed basic cardiac diet restrictions. Provided with Eating Heart-Healthy handout to reinforce. Receptive to instruction. May benefit from outpt f/u. Expect good compliance. RD contact information provided to pt.        Heavenly Dejesus, MS, RDN, LDN  Clinical Dietitian

## 2025-05-13 NOTE — CARDIAC REHAB
Cardiac Rehab Phase I    Education:  Handouts provided and reviewed: Caring For Your Heart Booklet provided to patient and daughter.     Diet: Healthy Cardiac diet reviewed.    Disease Process: Disease process reviewed.    Reviewed the following: SITE CARE: reviewed right groin site care      RISK FACTORS: reviewed      SMOKING CESSATION: non smoker      HOME EXERCISE ACTIVITY: reviewed      OUTPATIENT CARDIAC REHAB: referred to phase 2 cardiac rehab

## 2025-05-13 NOTE — PLAN OF CARE
Admission complete.   Problem: Patient Centered Care  Goal: Patient preferences are identified and integrated in the patient's plan of care  Description: Interventions:- What would you like us to know as we care for you? My son works here- Provide timely, complete, and accurate information to patient/family- Incorporate patient and family knowledge, values, beliefs, and cultural backgrounds into the planning and delivery of care- Encourage patient/family to participate in care and decision-making at the level they choose- Honor patient and family perspectives and choices  Outcome: Progressing     Problem: Diabetes/Glucose Control  Goal: Glucose maintained within prescribed range  Description: INTERVENTIONS:- Monitor Blood Glucose as ordered- Assess for signs and symptoms of hyperglycemia and hypoglycemia- Administer ordered medications to maintain glucose within target range- Assess barriers to adequate nutritional intake and initiate nutrition consult as needed- Instruct patient on self management of diabetes  Outcome: Progressing     Problem: Patient/Family Goals  Goal: Patient/Family Long Term Goal  Description: Patient's Long Term Goal: return home Interventions:- follow plan of care - See additional Care Plan goals for specific interventions  Outcome: Progressing  Goal: Patient/Family Short Term Goal  Description: Patient's Short Term Goal: live heart healthy Interventions: - cardiac diet, exercise, medication - See additional Care Plan goals for specific interventions  Outcome: Progressing     Problem: PAIN - ADULT  Goal: Verbalizes/displays adequate comfort level or patient's stated pain goal  Description: INTERVENTIONS:- Encourage pt to monitor pain and request assistance- Assess pain using appropriate pain scale- Administer analgesics based on type and severity of pain and evaluate response- Implement non-pharmacological measures as appropriate and evaluate response- Consider cultural and social influences  on pain and pain management- Manage/alleviate anxiety- Utilize distraction and/or relaxation techniques- Monitor for opioid side effects- Notify MD/LIP if interventions unsuccessful or patient reports new pain- Anticipate increased pain with activity and pre-medicate as appropriate  Outcome: Progressing     Problem: CARDIOVASCULAR - ADULT  Goal: Maintains optimal cardiac output and hemodynamic stability  Description: INTERVENTIONS:- Monitor vital signs, rhythm, and trends- Monitor for bleeding, hypotension and signs of decreased cardiac output- Evaluate effectiveness of vasoactive medications to optimize hemodynamic stability- Monitor arterial and/or venous puncture sites for bleeding and/or hematoma- Assess quality of pulses, skin color and temperature- Assess for signs of decreased coronary artery perfusion - ex. Angina- Evaluate fluid balance, assess for edema, trend weights  Outcome: Progressing  Goal: Absence of cardiac arrhythmias or at baseline  Description: INTERVENTIONS:- Continuous cardiac monitoring, monitor vital signs, obtain 12 lead EKG if indicated- Evaluate effectiveness of antiarrhythmic and heart rate control medications as ordered- Initiate emergency measures for life threatening arrhythmias- Monitor electrolytes and administer replacement therapy as ordered  Outcome: Progressing

## 2025-05-14 PROBLEM — E13.9 DIABETES 1.5, MANAGED AS TYPE 2 (HCC): Status: ACTIVE | Noted: 2025-05-14

## 2025-05-14 LAB
ANION GAP SERPL CALC-SCNC: 6 MMOL/L (ref 0–18)
ATRIAL RATE: 62 BPM
BUN BLD-MCNC: 8 MG/DL (ref 9–23)
BUN/CREAT SERPL: 8 (ref 10–20)
CALCIUM BLD-MCNC: 8.3 MG/DL (ref 8.7–10.4)
CHLORIDE SERPL-SCNC: 104 MMOL/L (ref 98–112)
CHOLEST SERPL-MCNC: 102 MG/DL (ref ?–200)
CO2 SERPL-SCNC: 22 MMOL/L (ref 21–32)
CREAT BLD-MCNC: 1 MG/DL (ref 0.7–1.3)
DEPRECATED RDW RBC AUTO: 41 FL (ref 35.1–46.3)
EGFRCR SERPLBLD CKD-EPI 2021: 79 ML/MIN/1.73M2 (ref 60–?)
ERYTHROCYTE [DISTWIDTH] IN BLOOD BY AUTOMATED COUNT: 12.4 % (ref 11–15)
EST. AVERAGE GLUCOSE BLD GHB EST-MCNC: 169 MG/DL (ref 68–126)
GLUCOSE BLD-MCNC: 216 MG/DL (ref 70–99)
GLUCOSE BLDC GLUCOMTR-MCNC: 170 MG/DL (ref 70–99)
GLUCOSE BLDC GLUCOMTR-MCNC: 218 MG/DL (ref 70–99)
GLUCOSE BLDC GLUCOMTR-MCNC: 229 MG/DL (ref 70–99)
GLUCOSE BLDC GLUCOMTR-MCNC: 275 MG/DL (ref 70–99)
GLUCOSE BLDC GLUCOMTR-MCNC: 402 MG/DL (ref 70–99)
HBA1C MFR BLD: 7.5 % (ref ?–5.7)
HCT VFR BLD AUTO: 24.7 % (ref 39–53)
HCT VFR BLD AUTO: 26.9 % (ref 39–53)
HDLC SERPL-MCNC: 36 MG/DL (ref 40–59)
HGB BLD-MCNC: 8.3 G/DL (ref 13–17.5)
HGB BLD-MCNC: 9.2 G/DL (ref 13–17.5)
IRON SATN MFR SERPL: 26 % (ref 20–50)
IRON SERPL-MCNC: 62 UG/DL (ref 65–175)
LDLC SERPL CALC-MCNC: 38 MG/DL (ref ?–100)
MCH RBC QN AUTO: 30.9 PG (ref 26–34)
MCHC RBC AUTO-ENTMCNC: 33.6 G/DL (ref 31–37)
MCV RBC AUTO: 91.8 FL (ref 80–100)
NONHDLC SERPL-MCNC: 66 MG/DL (ref ?–130)
OSMOLALITY SERPL CALC.SUM OF ELEC: 279 MOSM/KG (ref 275–295)
P AXIS: 69 DEGREES
P-R INTERVAL: 146 MS
PLATELET # BLD AUTO: 104 10(3)UL (ref 150–450)
PLATELET # BLD AUTO: 212 10(3)UL (ref 150–450)
PLATELETS.RETICULATED NFR BLD AUTO: 3.8 % (ref 0–7)
PLATELETS.RETICULATED NFR BLD AUTO: 3.9 % (ref 0–7)
POTASSIUM SERPL-SCNC: 5.3 MMOL/L (ref 3.5–5.1)
Q-T INTERVAL: 440 MS
QRS DURATION: 132 MS
QTC CALCULATION (BEZET): 446 MS
R AXIS: 46 DEGREES
RBC # BLD AUTO: 2.69 X10(6)UL (ref 3.8–5.8)
SODIUM SERPL-SCNC: 132 MMOL/L (ref 136–145)
T AXIS: 50 DEGREES
TOTAL IRON BINDING CAPACITY: 236 UG/DL (ref 250–425)
TRANSFERRIN SERPL-MCNC: 165 MG/DL (ref 215–365)
TRIGL SERPL-MCNC: 166 MG/DL (ref 30–149)
VENTRICULAR RATE: 62 BPM
VLDLC SERPL CALC-MCNC: 23 MG/DL (ref 0–30)
WBC # BLD AUTO: 6.2 X10(3) UL (ref 4–11)

## 2025-05-14 PROCEDURE — 99215 OFFICE O/P EST HI 40 MIN: CPT | Performed by: HOSPITALIST

## 2025-05-14 RX ORDER — INSULIN DEGLUDEC 100 U/ML
9 INJECTION, SOLUTION SUBCUTANEOUS NIGHTLY
Status: DISCONTINUED | OUTPATIENT
Start: 2025-05-14 | End: 2025-05-15

## 2025-05-14 RX ADMIN — INSULIN DEGLUDEC 9 UNITS: 100 INJECTION, SOLUTION SUBCUTANEOUS at 21:17:00

## 2025-05-14 RX ADMIN — ROSUVASTATIN CALCIUM 20 MG: 20 TABLET, COATED ORAL at 21:17:00

## 2025-05-14 RX ADMIN — LATANOPROST 1 DROP: 50 SOLUTION/ DROPS OPHTHALMIC at 21:17:00

## 2025-05-14 RX ADMIN — ACETAMINOPHEN 650 MG: 325 TABLET ORAL at 06:22:00

## 2025-05-14 RX ADMIN — CLOPIDOGREL BISULFATE 75 MG: 75 TABLET ORAL at 09:01:00

## 2025-05-14 RX ADMIN — ASPIRIN 81 MG: 81 TABLET ORAL at 09:01:00

## 2025-05-14 RX ADMIN — LOSARTAN POTASSIUM 100 MG: 100 TABLET ORAL at 09:02:00

## 2025-05-14 NOTE — PROGRESS NOTES
[facility]  part of [facility]    Progress Note    Nic Leon Patient Status:  Outpatient in a Bed    1950 MRN [id number]   Location [facility] 3W/SW Attending Lili Paulson DO   Hosp Day # 0 PCP No primary care provider on file.     Chief complaint lad stent , dm     Subjective:   Nic Leon is a(n) 74 year old male doing well. No c/o's     ROS:   No cp, sob   No c/d   No n/v     Objective:     Blood pressure 110/53, pulse 89, temperature 97.6 °F (36.4 °C), temperature source Oral, resp. rate 18, height 5' 3\" (1.6 m), weight 134 lb 11.2 oz (61.1 kg), SpO2 99%.      Intake/Output Summary (Last 24 hours) at 2025 1740  Last data filed at 2025 1400  Gross per 24 hour   Intake 1340 ml   Output 0 ml   Net 1340 ml       Patient Weight(s) for the past 336 hrs:   Weight   25 0625 134 lb 11.2 oz (61.1 kg)   25 1743 129 lb (58.5 kg)   25 1258 125 lb (56.7 kg)           General appearance: alert, appears stated age and cooperative  Pulmonary:  clear to auscultation bilaterally  Cardiovascular: S1, S2 normal, no murmur, click, rub or gallop, regular rate and rhythm  Abdominal: soft, non-tender; bowel sounds normal; no masses,  no organomegaly  Extremities: extremities normal, atraumatic, no cyanosis or edema    Right groin with bruise     Medicines:     Current Hospital Medications[1]            Blood Sugar Medications            metFORMIN 500 MG Oral Tab    glipiZIDE 5 MG Oral Tab            Blood Pressure and Cardiac Medications            losartan 100 MG Oral Tab            Medication Infusions[2]        Lab Results   Component Value Date    WBC 6.2 2025    HGB 9.2 (L) 2025    HCT 26.9 (L) 2025    .0 2025    CREATSERUM 1.00 2025    BUN 8 (L) 2025     (L) 2025    K 5.3 (H) 2025     2025    CO2 22.0 2025     (H) 2025    CA 8.3 (L) 2025       US  GROIN RIGHT LIMITED (CPT=76882)  Result Date: 5/13/2025  CONCLUSION: 1. No evidence of pseudoaneurysm.  Small hematoma suspected adjacent to the common femoral artery..    Dictated by (CST): Luis Diallo MD on 5/13/2025 at 4:57 PM     Finalized by (CST): Luis Diallo MD on 5/13/2025 at 4:59 PM          EKG 12 Lead  Result Date: 5/14/2025  Normal sinus rhythm Right bundle branch block Inferior infarct , age undetermined Abnormal ECG No previous ECGs found in Muse Confirmed by CARA MONREAL ELMER ([id number]) on 5/14/2025 4:37:35 PM      Results:     CBC:    Lab Results   Component Value Date    WBC 6.2 05/14/2025    WBC 6.6 05/11/2025    WBC 8.2 05/07/2025     Lab Results   Component Value Date    HGB 9.2 (L) 05/14/2025    HGB 8.3 (L) 05/14/2025    HGB 11.6 (L) 05/11/2025      Lab Results   Component Value Date    .0 05/14/2025    .0 (L) 05/14/2025    .0 05/11/2025       Recent Labs   Lab 05/11/25  0940 05/13/25  0909 05/14/25  0728   * 135* 216*   BUN 10 12 8*   CREATSERUM 1.15 1.12 1.00   CA 8.6* 8.8 8.3*   * 135* 132*   K 4.5 4.5 5.3*    105 104   CO2 25.0 24.0 22.0           Assessment and Plan:        ASSESSMENT:    1.       Coronary artery disease, status post first-stage intervention, left anterior descending PCI stent.  Cont current meds   Apprec cards consult     2.       Right groin hematoma.  - monitor hb    - reviewed us     3.       Diabetes mellitus type 2.   - add tresiba and increase iss      4. HTN - cont losartan     5. HL - cont statin     6. Anemia due to hematoma   - check iron         Lili Paulson,          Chart reviewed, including current vitals, notes, labs and imaging  Labs ordered and medications adjusted as outlined above  Coordinate care with care team/consultants  Discussed with patient results of tests, management plan as outlined above, and the need for ongoing hospitalization  D/w RN     MDM high        5/14/2025             Supplementary  Documentation:   DVT Mechanical Prophylaxis:        DVT Pharmacologic Prophylaxis   Medication   None         DVT Pharmacologic prophylaxis: Aspirin 81 mg      Code Status: Not on file  Euceda: No urinary catheter in place  Euceda Duration (in days):   Central line:    CHAD: 5/15/2025                            [1]   Current Facility-Administered Medications   Medication Dose Route Frequency    insulin degludec (Tresiba) 100 units/mL flextouch 9 Units  9 Units Subcutaneous Nightly    clopidogrel (Plavix) tab 75 mg  75 mg Oral Daily    aspirin DR tab 81 mg  81 mg Oral Daily    latanoprost (Xalatan) 0.005 % ophthalmic solution 1 drop  1 drop Both Eyes Nightly    losartan (Cozaar) tab 100 mg  100 mg Oral Daily    rosuvastatin (Crestor) tab 20 mg  20 mg Oral Nightly    glucose (Dex4) 15 GM/59ML oral liquid 15 g  15 g Oral Q15 Min PRN    Or    glucose (Glutose) 40% oral gel 15 g  15 g Oral Q15 Min PRN    Or    glucose-vitamin C (Dex-4) chewable tab 4 tablet  4 tablet Oral Q15 Min PRN    Or    dextrose 50% injection 50 mL  50 mL Intravenous Q15 Min PRN    Or    glucose (Dex4) 15 GM/59ML oral liquid 30 g  30 g Oral Q15 Min PRN    Or    glucose (Glutose) 40% oral gel 30 g  30 g Oral Q15 Min PRN    Or    glucose-vitamin C (Dex-4) chewable tab 8 tablet  8 tablet Oral Q15 Min PRN    insulin aspart (NovoLOG) 100 Units/mL FlexPen 1-7 Units  1-7 Units Subcutaneous TID CC and HS    acetaminophen (Tylenol) tab 650 mg  650 mg Oral Q6H PRN    HYDROcodone-acetaminophen (Norco) 5-325 MG per tab 1 tablet  1 tablet Oral Q6H PRN   [2]

## 2025-05-14 NOTE — PLAN OF CARE
PM labs reviewed, noted improvement in hemoglobin, plts. On exam, right groin bruising has spread, remains soft on palpation. Pt admits to some tenderness, denies further complaints. Pt and son would feel more comfortable staying overnight for observation and recheck AM labs. If stable, will discharge in the morning.       Plan:  -Repeat CBC tomorrow morning  -continue current medications  -monitor right groin bruising      Giulia Lima, MSN, APN, FNP-BC, CCK  05/14/25  4:40 PM

## 2025-05-14 NOTE — PLAN OF CARE
Pt alert and oriented x4. Independent. R groin site is bruised and soft. Pt and family updated on plan of care. Plan to monitor labs. Safety precautions in place. Call light within reach.    Problem: Patient Centered Care  Goal: Patient preferences are identified and integrated in the patient's plan of care  Description: Interventions:- What would you like us to know as we care for you? My son works here- Provide timely, complete, and accurate information to patient/family- Incorporate patient and family knowledge, values, beliefs, and cultural backgrounds into the planning and delivery of care- Encourage patient/family to participate in care and decision-making at the level they choose- Honor patient and family perspectives and choices  Outcome: Progressing     Problem: Diabetes/Glucose Control  Goal: Glucose maintained within prescribed range  Description: INTERVENTIONS:- Monitor Blood Glucose as ordered- Assess for signs and symptoms of hyperglycemia and hypoglycemia- Administer ordered medications to maintain glucose within target range- Assess barriers to adequate nutritional intake and initiate nutrition consult as needed- Instruct patient on self management of diabetes  Outcome: Progressing     Problem: Patient/Family Goals  Goal: Patient/Family Long Term Goal  Description: Patient's Long Term Goal: return home Interventions:- follow plan of care - See additional Care Plan goals for specific interventions  Outcome: Progressing  Goal: Patient/Family Short Term Goal  Description: Patient's Short Term Goal: live heart healthy Interventions: - cardiac diet, exercise, medication - See additional Care Plan goals for specific interventions  Outcome: Progressing     Problem: PAIN - ADULT  Goal: Verbalizes/displays adequate comfort level or patient's stated pain goal  Description: INTERVENTIONS:- Encourage pt to monitor pain and request assistance- Assess pain using appropriate pain scale- Administer analgesics based  on type and severity of pain and evaluate response- Implement non-pharmacological measures as appropriate and evaluate response- Consider cultural and social influences on pain and pain management- Manage/alleviate anxiety- Utilize distraction and/or relaxation techniques- Monitor for opioid side effects- Notify MD/LIP if interventions unsuccessful or patient reports new pain- Anticipate increased pain with activity and pre-medicate as appropriate  Outcome: Progressing     Problem: CARDIOVASCULAR - ADULT  Goal: Maintains optimal cardiac output and hemodynamic stability  Description: INTERVENTIONS:- Monitor vital signs, rhythm, and trends- Monitor for bleeding, hypotension and signs of decreased cardiac output- Evaluate effectiveness of vasoactive medications to optimize hemodynamic stability- Monitor arterial and/or venous puncture sites for bleeding and/or hematoma- Assess quality of pulses, skin color and temperature- Assess for signs of decreased coronary artery perfusion - ex. Angina- Evaluate fluid balance, assess for edema, trend weights  Outcome: Progressing  Goal: Absence of cardiac arrhythmias or at baseline  Description: INTERVENTIONS:- Continuous cardiac monitoring, monitor vital signs, obtain 12 lead EKG if indicated- Evaluate effectiveness of antiarrhythmic and heart rate control medications as ordered- Initiate emergency measures for life threatening arrhythmias- Monitor electrolytes and administer replacement therapy as ordered  Outcome: Progressing

## 2025-05-14 NOTE — PLAN OF CARE
Problem: Patient Centered Care  Goal: Patient preferences are identified and integrated in the patient's plan of care  Description: Interventions:- What would you like us to know as we care for you? My son works here- Provide timely, complete, and accurate information to patient/family- Incorporate patient and family knowledge, values, beliefs, and cultural backgrounds into the planning and delivery of care- Encourage patient/family to participate in care and decision-making at the level they choose- Honor patient and family perspectives and choices  Outcome: Progressing     Problem: Diabetes/Glucose Control  Goal: Glucose maintained within prescribed range  Description: INTERVENTIONS:- Monitor Blood Glucose as ordered- Assess for signs and symptoms of hyperglycemia and hypoglycemia- Administer ordered medications to maintain glucose within target range- Assess barriers to adequate nutritional intake and initiate nutrition consult as needed- Instruct patient on self management of diabetes  Outcome: Progressing     Problem: Patient/Family Goals  Goal: Patient/Family Long Term Goal  Description: Patient's Long Term Goal: return home Interventions:- follow plan of care - See additional Care Plan goals for specific interventions  Outcome: Progressing  Goal: Patient/Family Short Term Goal  Description: Patient's Short Term Goal: live heart healthy Interventions: - cardiac diet, exercise, medication - See additional Care Plan goals for specific interventions  Outcome: Progressing     Problem: PAIN - ADULT  Goal: Verbalizes/displays adequate comfort level or patient's stated pain goal  Description: INTERVENTIONS:- Encourage pt to monitor pain and request assistance- Assess pain using appropriate pain scale- Administer analgesics based on type and severity of pain and evaluate response- Implement non-pharmacological measures as appropriate and evaluate response- Consider cultural and social influences on pain and pain  management- Manage/alleviate anxiety- Utilize distraction and/or relaxation techniques- Monitor for opioid side effects- Notify MD/LIP if interventions unsuccessful or patient reports new pain- Anticipate increased pain with activity and pre-medicate as appropriate  Outcome: Progressing     Problem: CARDIOVASCULAR - ADULT  Goal: Maintains optimal cardiac output and hemodynamic stability  Description: INTERVENTIONS:- Monitor vital signs, rhythm, and trends- Monitor for bleeding, hypotension and signs of decreased cardiac output- Evaluate effectiveness of vasoactive medications to optimize hemodynamic stability- Monitor arterial and/or venous puncture sites for bleeding and/or hematoma- Assess quality of pulses, skin color and temperature- Assess for signs of decreased coronary artery perfusion - ex. Angina- Evaluate fluid balance, assess for edema, trend weights  Outcome: Progressing  Goal: Absence of cardiac arrhythmias or at baseline  Description: INTERVENTIONS:- Continuous cardiac monitoring, monitor vital signs, obtain 12 lead EKG if indicated- Evaluate effectiveness of antiarrhythmic and heart rate control medications as ordered- Initiate emergency measures for life threatening arrhythmias- Monitor electrolytes and administer replacement therapy as ordered  Outcome: Progressing

## 2025-05-14 NOTE — PROGRESS NOTES
Progress Note  Nic Leon Patient Status:  Outpatient in a Bed    1950 MRN [id number]   Location [facility] 3W/SW Attending Lili Paulson DO   Hosp Day # 0 PCP No primary care provider on file.     Subjective:  S/p staged intervention IVUS guided shockwave and PCI proximal LAD    Right groin hematoma, US negative for pseudoaneurysm  Hgb dropped 3g from 11.6-8.3  Admits to slight dizziness when getting up from laying this morning    Objective:  /59 (BP Location: Right arm)   Pulse 69   Temp 97.4 °F (36.3 °C) (Oral)   Resp 18   Ht 5' 3\" (1.6 m)   Wt 134 lb 11.2 oz (61.1 kg)   SpO2 100%   BMI 23.86 kg/m²     Telemetry: SR    Intake/Output:    Intake/Output Summary (Last 24 hours) at 2025 0911  Last data filed at 2025 0000  Gross per 24 hour   Intake 740 ml   Output 0 ml   Net 740 ml     Last 3 Weights   25 0625 134 lb 11.2 oz (61.1 kg)   25 1743 129 lb (58.5 kg)   25 1258 125 lb (56.7 kg)   25 0654 125 lb 3.2 oz (56.8 kg)   25 1525 127 lb (57.6 kg)   22 1940 150 lb (68 kg)     Labs:  Recent Labs   Lab 25  0940 25  0909 25  0728   * 135* 216*   BUN 10 12 8*   CREATSERUM 1.15 1.12 1.00   EGFRCR 67 69 79   CA 8.6* 8.8 8.3*   * 135* 132*   K 4.5 4.5 5.3*    105 104   CO2 25.0 24.0 22.0     Recent Labs   Lab 25  1017 25  0940 25  0728   RBC 4.21 3.76* 2.69*   HGB 12.9* 11.6* 8.3*   HCT 34.8* 33.0* 24.7*   MCV 82.7 87.8 91.8   MCH 30.6 30.9 30.9   MCHC 37.1* 35.2 33.6   RDW 11.9 11.9 12.4   NEPRELIM  --  4.53  --    WBC 8.2 6.6 6.2   .0 276.0 104.0*     No results for input(s): \"TROP\", \"TROPHS\", \"CK\" in the last 168 hours.  No results found for: \"CHOLESTEROL\", \"HDL\", \"LDL\", \"TRIG\", \"NONHDL\", \"CHOLHDL\"  No results found for: \"DDIMER\"  No results found for: \"TSH\"    Review of Systems:   Constitutional: No fevers, chills, fatigue or night sweats.  Respiratory: Denies cough,  wheezing or shortness of breath.  CV: Denies chest pain, palpitations, orthopnea, PND or dizziness.  GI: No nausea, vomiting or diarrhea. No blood in stools.    Physical Exam:   General: Alert, cooperative, no distress, appears stated age.  Neck: no JVD.  Lungs: Clear to auscultation bilaterally.  Chest wall: No tenderness or deformity.  Heart: Regular rate and rhythm, S1, S2 normal, no murmur, click, rub or gallop.  Abdomen: Soft, non-tender. Bowel sounds normal. No masses,  No organomegaly.  Extremities: Extremities normal, atraumatic, no cyanosis or edema, right groin c/d/I, no bleeding, significant bruising noted, no hematoma,no bruit on auscultation, pulses palpable.  Pulses: 2+ and symmetric all extremities.  Neurologic: Grossly intact.    Medications:  Scheduled Medications[1]  Medication Infusions[2]    Assessment:    74 year old male with PMH of bicuspid aortic valve with mild to moderate aortic stenosis, HTN, HLD, DM who presented for scheduled coronary angiogram following abnormal stress test that was done due to increasing dyspnea on exertion.     Multivessel CAD s/p PCI proximal LAD  [facility] 5/9 revealed 100%  RCA and Cx, 95% proximal LAD stenosis  -LVEF 50-55% with hypokineis of basal inferior and basal inferolateral walls  -[facility] consulted for bypass and SAVR, Dr. Navarrete noted he likely has poor targets, pt opted for complex PCI for intervention instead of surgery  -s/p IVUS guided shockwave and PCI proximal LAD with CAREY x1 with plans for staged intervention  Cx and RCA PCI in June  -right groin hematoma post procedure, admitted for overnight observation  -asa, plavix, high intensity statin  -Hgb dropped 3g from previous on 5/11, will repeat H/H this afternoon to determine if additional imaging is needed    HTN  BP mostly controlled, slightly elevated this morning at 160, good response to AM meds  -losartan    HLD-high intensity statin    Bicuspid aortic valve  Mild to moderate aortic stenosis  No  current indication for SAVR per Dr. Navarrete    DM2-A1c added on to AM labs, pending    Acute anemia  Thrombocytopenia  Hgb 11.6 on 5/11, 8.3 today  -hematoma to right groin, US negative for pseudoaneurysm  -possible dilutional from procedure  -repeat Hgb slightly increased to 9.2 this afternoon   -plts low this morning 104, will add on to PM labs for repeat    Hyponatremia  Hyperkalemia  Na 132, K+5.3    Plan:  -Continue asa, plavix, statin, losartan  -repeat plt count pending  -plan for  in June  -if labs remain stable, can discharge home later this afternoon      Plan of care discussed with patient, RN.    Giulia Lima, MSN, APN, FNP-BC, CCK  5/14/2025  9:11 AM      Cardiologist Addendum:  Nic Leon was seen and examined independently. I agree with the above documented findings of the complexity of problems addressed by PERFECTO Choudhury.  I take responsibility for the plan's risks and complications. Right groin cath site post-procedure bleed/hematoma. Arterial doppler w/o hematoma or pseudo. Groin soft and hgb uptrending. Ok to discharge today on dapt, statin, and close f/u in cardiology clinic.    Thank you for allowing me to take part in the care of Nic Vaibhav Leon. Please call with any questions of concerns.    L3    Bc Wetzel DO  [facility]   Interventional Cardiac and Vascular Services      May 14, 2025  4:34 PM           [1]    aspirin  324 mg Oral Once    clopidogrel  75 mg Oral Daily    aspirin  81 mg Oral Daily    latanoprost  1 drop Both Eyes Nightly    losartan  100 mg Oral Daily    rosuvastatin  20 mg Oral Nightly    insulin aspart  1-7 Units Subcutaneous TID CC and HS   [2]

## 2025-05-15 ENCOUNTER — APPOINTMENT (OUTPATIENT)
Dept: CT IMAGING | Facility: HOSPITAL | Age: 75
End: 2025-05-15
Attending: NURSE PRACTITIONER
Payer: MEDICARE

## 2025-05-15 VITALS
HEART RATE: 93 BPM | TEMPERATURE: 98 F | BODY MASS INDEX: 22.64 KG/M2 | SYSTOLIC BLOOD PRESSURE: 106 MMHG | WEIGHT: 127.81 LBS | HEIGHT: 63 IN | RESPIRATION RATE: 20 BRPM | DIASTOLIC BLOOD PRESSURE: 53 MMHG | OXYGEN SATURATION: 100 %

## 2025-05-15 PROBLEM — I25.10 CAD (CORONARY ARTERY DISEASE): Status: ACTIVE | Noted: 2025-05-13

## 2025-05-15 LAB
ANION GAP SERPL CALC-SCNC: 6 MMOL/L (ref 0–18)
BASOPHILS # BLD AUTO: 0.03 X10(3) UL (ref 0–0.2)
BASOPHILS NFR BLD AUTO: 0.5 %
BUN BLD-MCNC: 9 MG/DL (ref 9–23)
BUN/CREAT SERPL: 9.2 (ref 10–20)
CALCIUM BLD-MCNC: 8.4 MG/DL (ref 8.7–10.4)
CHLORIDE SERPL-SCNC: 100 MMOL/L (ref 98–112)
CO2 SERPL-SCNC: 27 MMOL/L (ref 21–32)
CREAT BLD-MCNC: 0.98 MG/DL (ref 0.7–1.3)
DEPRECATED RDW RBC AUTO: 38.3 FL (ref 35.1–46.3)
DEPRECATED RDW RBC AUTO: 39.6 FL (ref 35.1–46.3)
EGFRCR SERPLBLD CKD-EPI 2021: 81 ML/MIN/1.73M2 (ref 60–?)
EOSINOPHIL # BLD AUTO: 0.15 X10(3) UL (ref 0–0.7)
EOSINOPHIL NFR BLD AUTO: 2.4 %
ERYTHROCYTE [DISTWIDTH] IN BLOOD BY AUTOMATED COUNT: 12.3 % (ref 11–15)
ERYTHROCYTE [DISTWIDTH] IN BLOOD BY AUTOMATED COUNT: 12.3 % (ref 11–15)
GLUCOSE BLD-MCNC: 151 MG/DL (ref 70–99)
GLUCOSE BLDC GLUCOMTR-MCNC: 166 MG/DL (ref 70–99)
GLUCOSE BLDC GLUCOMTR-MCNC: 437 MG/DL (ref 70–99)
HCT VFR BLD AUTO: 22 % (ref 39–53)
HCT VFR BLD AUTO: 23.2 % (ref 39–53)
HGB BLD-MCNC: 7.9 G/DL (ref 13–17.5)
HGB BLD-MCNC: 8.2 G/DL (ref 13–17.5)
IMM GRANULOCYTES # BLD AUTO: 0.02 X10(3) UL (ref 0–1)
IMM GRANULOCYTES NFR BLD: 0.3 %
LYMPHOCYTES # BLD AUTO: 1.28 X10(3) UL (ref 1–4)
LYMPHOCYTES NFR BLD AUTO: 20.7 %
MCH RBC QN AUTO: 31.1 PG (ref 26–34)
MCH RBC QN AUTO: 31.2 PG (ref 26–34)
MCHC RBC AUTO-ENTMCNC: 35.3 G/DL (ref 31–37)
MCHC RBC AUTO-ENTMCNC: 35.9 G/DL (ref 31–37)
MCV RBC AUTO: 86.6 FL (ref 80–100)
MCV RBC AUTO: 88.2 FL (ref 80–100)
MONOCYTES # BLD AUTO: 0.69 X10(3) UL (ref 0.1–1)
MONOCYTES NFR BLD AUTO: 11.2 %
NEUTROPHILS # BLD AUTO: 4 X10 (3) UL (ref 1.5–7.7)
NEUTROPHILS # BLD AUTO: 4 X10(3) UL (ref 1.5–7.7)
NEUTROPHILS NFR BLD AUTO: 64.9 %
OSMOLALITY SERPL CALC.SUM OF ELEC: 278 MOSM/KG (ref 275–295)
PLATELET # BLD AUTO: 212 10(3)UL (ref 150–450)
PLATELET # BLD AUTO: 217 10(3)UL (ref 150–450)
POTASSIUM SERPL-SCNC: 4.6 MMOL/L (ref 3.5–5.1)
RBC # BLD AUTO: 2.54 X10(6)UL (ref 3.8–5.8)
RBC # BLD AUTO: 2.63 X10(6)UL (ref 3.8–5.8)
SODIUM SERPL-SCNC: 133 MMOL/L (ref 136–145)
WBC # BLD AUTO: 5.3 X10(3) UL (ref 4–11)
WBC # BLD AUTO: 6.2 X10(3) UL (ref 4–11)

## 2025-05-15 PROCEDURE — 74174 CTA ABD&PLVS W/CONTRAST: CPT | Performed by: NURSE PRACTITIONER

## 2025-05-15 PROCEDURE — 99214 OFFICE O/P EST MOD 30 MIN: CPT | Performed by: HOSPITALIST

## 2025-05-15 RX ORDER — FERROUS SULFATE 325(65) MG
325 TABLET, DELAYED RELEASE (ENTERIC COATED) ORAL
Status: DISCONTINUED | OUTPATIENT
Start: 2025-05-16 | End: 2025-05-15

## 2025-05-15 RX ORDER — FERROUS SULFATE 325(65) MG
325 TABLET, DELAYED RELEASE (ENTERIC COATED) ORAL
Status: DISCONTINUED | OUTPATIENT
Start: 2025-05-15 | End: 2025-05-15

## 2025-05-15 RX ORDER — DOCUSATE SODIUM 100 MG/1
100 CAPSULE, LIQUID FILLED ORAL 2 TIMES DAILY
Qty: 30 CAPSULE | Refills: 0 | Status: SHIPPED | OUTPATIENT
Start: 2025-05-15

## 2025-05-15 RX ORDER — FERROUS SULFATE 325(65) MG
325 TABLET, DELAYED RELEASE (ENTERIC COATED) ORAL
Qty: 30 TABLET | Refills: 0 | Status: SHIPPED | OUTPATIENT
Start: 2025-05-15

## 2025-05-15 RX ADMIN — CLOPIDOGREL BISULFATE 75 MG: 75 TABLET ORAL at 09:20:00

## 2025-05-15 RX ADMIN — LOSARTAN POTASSIUM 100 MG: 100 TABLET ORAL at 09:20:00

## 2025-05-15 RX ADMIN — FERROUS SULFATE 325 MG: 325(65) MG TABLET, DELAYED RELEASE (ENTERIC COATED) ORAL at 16:12:00

## 2025-05-15 RX ADMIN — ASPIRIN 81 MG: 81 TABLET ORAL at 09:20:00

## 2025-05-15 NOTE — PROGRESS NOTES
Consult received and CT images reviewed. Small right groin and retroperitoneal hematoma with no active extravasation of contrast. 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 Ok for discharge from a vascular standpoint. Will contact patient to follow-up with me in the office next week.     Alee Washington MD  05/15/25  3:41 PM

## 2025-05-15 NOTE — PLAN OF CARE
Pt alert and oriented. CT abd/pelvis today. Medically cleared for dc. Education given. IV removed. Call light within reach. Safety precautions in place.   Problem: Patient Centered Care  Goal: Patient preferences are identified and integrated in the patient's plan of care  Description: Interventions:- What would you like us to know as we care for you? My son works here- Provide timely, complete, and accurate information to patient/family- Incorporate patient and family knowledge, values, beliefs, and cultural backgrounds into the planning and delivery of care- Encourage patient/family to participate in care and decision-making at the level they choose- Honor patient and family perspectives and choices  5/15/2025 1620 by Vanessa Jang RN  Outcome: Completed  5/15/2025 1339 by Vanessa Jang RN  Outcome: Progressing     Problem: Diabetes/Glucose Control  Goal: Glucose maintained within prescribed range  Description: INTERVENTIONS:- Monitor Blood Glucose as ordered- Assess for signs and symptoms of hyperglycemia and hypoglycemia- Administer ordered medications to maintain glucose within target range- Assess barriers to adequate nutritional intake and initiate nutrition consult as needed- Instruct patient on self management of diabetes  5/15/2025 1620 by Vanessa Jang RN  Outcome: Completed  5/15/2025 1339 by Vanessa Jang RN  Outcome: Progressing     Problem: Patient/Family Goals  Goal: Patient/Family Long Term Goal  Description: Patient's Long Term Goal: return home Interventions:- follow plan of care - See additional Care Plan goals for specific interventions  5/15/2025 1620 by Vanessa Jang RN  Outcome: Completed  5/15/2025 1339 by Vanessa Jang RN  Outcome: Progressing  Goal: Patient/Family Short Term Goal  Description: Patient's Short Term Goal: live heart healthy Interventions: - cardiac diet, exercise, medication - See additional Care Plan goals for specific interventions  5/15/2025 1620 by Suhail  AJ Mendez  Outcome: Completed  5/15/2025 1339 by Vanessa Jang RN  Outcome: Progressing     Problem: PAIN - ADULT  Goal: Verbalizes/displays adequate comfort level or patient's stated pain goal  Description: INTERVENTIONS:- Encourage pt to monitor pain and request assistance- Assess pain using appropriate pain scale- Administer analgesics based on type and severity of pain and evaluate response- Implement non-pharmacological measures as appropriate and evaluate response- Consider cultural and social influences on pain and pain management- Manage/alleviate anxiety- Utilize distraction and/or relaxation techniques- Monitor for opioid side effects- Notify MD/LIP if interventions unsuccessful or patient reports new pain- Anticipate increased pain with activity and pre-medicate as appropriate  5/15/2025 1620 by Vanessa Jang RN  Outcome: Completed  5/15/2025 1339 by Vanessa Jang RN  Outcome: Progressing     Problem: CARDIOVASCULAR - ADULT  Goal: Maintains optimal cardiac output and hemodynamic stability  Description: INTERVENTIONS:- Monitor vital signs, rhythm, and trends- Monitor for bleeding, hypotension and signs of decreased cardiac output- Evaluate effectiveness of vasoactive medications to optimize hemodynamic stability- Monitor arterial and/or venous puncture sites for bleeding and/or hematoma- Assess quality of pulses, skin color and temperature- Assess for signs of decreased coronary artery perfusion - ex. Angina- Evaluate fluid balance, assess for edema, trend weights  5/15/2025 1620 by Vanessa Jang RN  Outcome: Completed  5/15/2025 1339 by Vanessa Jang RN  Outcome: Progressing  Goal: Absence of cardiac arrhythmias or at baseline  Description: INTERVENTIONS:- Continuous cardiac monitoring, monitor vital signs, obtain 12 lead EKG if indicated- Evaluate effectiveness of antiarrhythmic and heart rate control medications as ordered- Initiate emergency measures for life threatening arrhythmias- Monitor  electrolytes and administer replacement therapy as ordered  5/15/2025 1620 by Vanessa Jang, RN  Outcome: Completed  5/15/2025 1339 by Vanessa Jang, RN  Outcome: Progressing

## 2025-05-15 NOTE — DISCHARGE SUMMARY
[facility]  part of [facility]    Discharge Summary    Nic Leon Patient Status:  Outpatient in a Bed    1950 MRN [id number]   Location [facility] 3W/SW Attending Lili Paulson DO   Hosp Day # 0 PCP No primary care provider on file.     Date of Admission: 2025 Disposition: Home or Self Care     Date of Discharge: 5/15/2025      Admitting Diagnosis: CAD (coronary artery disease) [I25.10]    Hospital Discharge Diagnoses: as above     Hospital Discharge Diagnoses: as above     Lace+ Score: 36  59-90 High Risk  29-58 Medium Risk  0-28   Low Risk.    TCM Follow-Up Recommendation:  LACE 29-58: Moderate Risk of readmission after discharge from the hospital.          Lace+ Score: 36  59-90 High Risk  29-58 Medium Risk  0-28   Low Risk    Risk of readmission: Nic Leon has Moderate Risk of readmission after discharge from the hospital.    Problem List: Problem List[1]    Reason for Admission: cad     Physical Exam:   General appearance: alert, appears stated age and cooperative  Pulmonary:  clear to auscultation bilaterally  Cardiovascular: S1, S2 normal, no murmur, click, rub or gallop, regular rate and rhythm  Abdominal: soft, non-tender; bowel sounds normal; no masses,  no organomegaly  Extremities: extremities normal, atraumatic, no cyanosis or edema  Psychiatric: calm        History of Present Illness:       REASON FOR ADMISSION:  Post LAD PCI intervention and right groin hematoma.     HISTORY OF PRESENT ILLNESS:  Patient is a 74-year-old [place] male with a history of dyspnea on exertion, recent evaluation by Cardiology, had an echocardiogram which showed normal ejection fraction with left ventricular diastolic dysfunction, mild to moderate aortic stenosis with bicuspid aortic valve.  Stress test showed reversible ischemia.  Cardiac angiogram done on May 9 showed 95% proximal LAD and chronic total occlusion of left circumflex and RCA.  Today, brought  in for first-stage intervention, LAD proximal PCI stent.  Postoperatively, had right groin hematoma.  Ultrasound showed no evidence of pseudoaneurysm.  Patient will be observed overnight.    Hospital Course:       1.       Coronary artery disease, status post first-stage intervention, left anterior descending PCI stent.  Cont current meds   Apprec cards consult      2.       Right groin hematoma.  - monitor hb  - down today. CT a/p reviewed. Small rph. Apprec vasc sx consult Dr Washington. Ok for dc and f/u with her next week. Repeat cbc one week with cards   - reviewed us      3.       Diabetes mellitus type 2.   - add tresiba and increase iss  ; restart home meds      4. HTN - cont losartan      5. HL - cont statin      6. Anemia due to hematoma   - check iron - cont oral iron. Add colace . F/u with pcp        Consultations: Dr Lockhart, Dr Washington    Procedures: cath     Complications: as above     Discharge Condition: Good    Discharge Medications:      Discharge Medications        CONTINUE taking these medications        Instructions Prescription details   aspirin 81 MG Tbec      Take 1 tablet (81 mg total) by mouth daily.   Refills: 0     clopidogrel 75 MG Tabs  Commonly known as: Plavix      Take 1 tablet (75 mg total) by mouth daily.   Quantity: 30 tablet  Refills: 0     glipiZIDE 5 MG Tabs  Commonly known as: Glucotrol      Take 1 tablet (5 mg total) by mouth 2 (two) times daily before meals.   Refills: 0     latanoprost 0.005 % Soln  Commonly known as: Xalatan      nightly.   Refills: 0     losartan 100 MG Tabs  Commonly known as: Cozaar      Take 1 tablet (100 mg total) by mouth daily.   Refills: 0     metFORMIN 500 MG Tabs  Commonly known as: Glucophage      Take 1 tablet (500 mg total) by mouth in the morning and 1 tablet (500 mg total) in the evening. Take with meals.   Refills: 0     rosuvastatin 20 MG Tabs  Commonly known as: Crestor      Take 1 tablet (20 mg total) by mouth nightly.   Refills: 0      testosterone cypionate 200 mg/mL Soln  Commonly known as: Depo-Testosterone      Inject 1.15 mL (230 mg total) into the muscle every 14 (fourteen) days.   Refills: 0     Vitamin D 25 MCG (1000 UT) Tabs      Take 1,000 Units by mouth daily.   Refills: 0            STOP taking these medications      PARoxetine 10 MG Tabs  Commonly known as: Paxil                 Follow up Visits: Follow-up with pcp  in 1 week    Follow up Labs: repeat cbc one week      Other Discharge Instructions: cont iron and colace     Lili Paulson,   5/15/2025  4:08 PM    > 35 min          [1]   Patient Active Problem List  Diagnosis    CAD (coronary artery disease)    Diabetes 1.5, managed as type 2 (HCC)

## 2025-05-15 NOTE — PROGRESS NOTES
Progress Note  Nic Leon Patient Status:  Outpatient in a Bed    1950 MRN [id number]   Location [facility] 3W/SW Attending Lili Paulson DO   Hosp Day # 0 PCP No primary care provider on file.     Subjective:  Hgb fluctuating, dropped 1g overnight again  Ecchymosis has progressed, not unexpected with hematoma    Objective:  /60 (BP Location: Right arm)   Pulse 76   Temp 97.3 °F (36.3 °C) (Oral)   Resp 20   Ht 5' 3\" (1.6 m)   Wt 127 lb 12.8 oz (58 kg)   SpO2 100%   BMI 22.64 kg/m²     Telemetry: SR    Intake/Output:    Intake/Output Summary (Last 24 hours) at 5/15/2025 0722  Last data filed at 5/15/2025 0620  Gross per 24 hour   Intake 790 ml   Output 2780 ml   Net -1990 ml     Last 3 Weights   05/15/25 0619 127 lb 12.8 oz (58 kg)   25 0625 134 lb 11.2 oz (61.1 kg)   25 1743 129 lb (58.5 kg)   25 1258 125 lb (56.7 kg)   25 0654 125 lb 3.2 oz (56.8 kg)   25 1525 127 lb (57.6 kg)   22 1940 150 lb (68 kg)     Labs:  Recent Labs   Lab 25  0940 25  0909 25  0728   * 135* 216*   BUN 10 12 8*   CREATSERUM 1.15 1.12 1.00   EGFRCR 67 69 79   CA 8.6* 8.8 8.3*   * 135* 132*   K 4.5 4.5 5.3*    105 104   CO2 25.0 24.0 22.0     Recent Labs   Lab 25  0940 25  0728 25  1304   RBC 3.76* 2.69*  --    HGB 11.6* 8.3* 9.2*   HCT 33.0* 24.7* 26.9*   MCV 87.8 91.8  --    MCH 30.9 30.9  --    MCHC 35.2 33.6  --    RDW 11.9 12.4  --    NEPRELIM 4.53  --   --    WBC 6.6 6.2  --    .0 104.0* 212.0     No results for input(s): \"TROP\", \"TROPHS\", \"CK\" in the last 168 hours.  Lab Results   Component Value Date/Time    HDL 36 (L) 2025 07:28 AM    LDL 38 2025 07:28 AM    TRIG 166 (H) 2025 07:28 AM     No results found for: \"DDIMER\"  No results found for: \"TSH\"    Review of Systems:   Constitutional: No fevers, chills, fatigue or night sweats.  Respiratory: Denies cough, wheezing or shortness  of breath.  CV: Denies chest pain, palpitations, orthopnea, PND or dizziness.  GI: No nausea, vomiting or diarrhea. No blood in stools.    Physical Exam:   General: Alert, cooperative, no distress, appears stated age.  Neck: no JVD.  Lungs: Clear to auscultation bilaterally.  Chest wall: No tenderness or deformity.  Heart: Regular rate and rhythm, S1, S2 normal, no murmur, click, rub or gallop.  Abdomen: Soft, non-tender. Bowel sounds normal. No masses,  No organomegaly.  Extremities: Extremities normal, atraumatic, no cyanosis or edema, right groin c/d/I, no bleeding, significant bruising noted to be spreading to scrotal, suprapubic, and hip region, remains soft, no bruit on auscultation, pulses palpable.  Pulses: 2+ and symmetric all extremities.  Neurologic: Grossly intact.    Medications:  Scheduled Medications[1]  Medication Infusions[2]    Assessment:    74 year old male with PMH of bicuspid aortic valve with mild to moderate aortic stenosis, HTN, HLD, DM who presented for scheduled coronary angiogram following abnormal stress test that was done due to increasing dyspnea on exertion.     Multivessel CAD s/p PCI proximal LAD  [facility] 5/9 revealed 100%  RCA and Cx, 95% proximal LAD stenosis  -LVEF 50-55% with hypokineis of basal inferior and basal inferolateral walls  -[facility] consulted for bypass and SAVR, Dr. Navarrete noted he likely has poor targets, pt opted for complex PCI for intervention instead of surgery  -s/p IVUS guided shockwave and PCI proximal LAD with CAREY x1 with plans for staged intervention  Cx and RCA PCI in June  -right groin hematoma post procedure, admitted for observation, ecchymosis progressing  -asa, plavix, high intensity statin  -Hgb dropped 3g from previous on 5/11, repeat H/H PM revealed slight improvement in Hgb, but still 2g less than previous, recheck this morning dropped 1g again, will repeat this afternoon    HTN  BP mostly controlled  -losartan    HLD-high intensity  statin    Bicuspid aortic valve  Mild to moderate aortic stenosis  No current indication for SAVR per Dr. Navarrete    DM2-A1c 7.5, SSI per primary    Acute anemia  Thrombocytopenia  Hgb 11.6 on 5/11, 8.3 on 5/14, repeat labs this morning with 1g drop  -hematoma to right groin, US negative for pseudoaneurysm  -possible dilutional from procedure     Hyponatremia  Hyperkalemia  Na 132, K+5.3  -AM labs pending    Plan:  -Continue asa, plavix, statin, losartan  -repeat CBC at noon, if Hgb continues to drop, will order CTA  -plan for  in June  -if labs remain stable, can discharge home later this afternoon      Plan of care discussed with patient, RN.      Giulia Lima, MSN, APN, FNP-BC, CCK  05/15/25  7:25 AM        Addendum: Repeat CBC revealed further drop in hgb to 7.9. Stat CTA abdomen/pelvis revealed small hematoma in right thigh and RP, small right common iliac dissection. Discussed results with Dr. Wetzel.     Plan:  -[facility] consult placed  -if no recs from vascular after review of imaging, can discharge home this afternoon    3:20 PM           [1]    insulin degludec  9 Units Subcutaneous Nightly    clopidogrel  75 mg Oral Daily    aspirin  81 mg Oral Daily    latanoprost  1 drop Both Eyes Nightly    losartan  100 mg Oral Daily    rosuvastatin  20 mg Oral Nightly    insulin aspart  1-7 Units Subcutaneous TID CC and HS   [2]

## 2025-05-19 ENCOUNTER — LAB ENCOUNTER (OUTPATIENT)
Dept: LAB | Facility: HOSPITAL | Age: 75
End: 2025-05-19
Attending: INTERNAL MEDICINE
Payer: MEDICARE

## 2025-05-19 DIAGNOSIS — D64.89 ANEMIA DUE TO OTHER CAUSE, NOT CLASSIFIED: ICD-10-CM

## 2025-05-19 DIAGNOSIS — E87.1 HYPONATREMIA: ICD-10-CM

## 2025-05-19 DIAGNOSIS — Z01.812 ENCOUNTER FOR PRE-OPERATIVE LABORATORY TESTING: ICD-10-CM

## 2025-05-19 DIAGNOSIS — I10 PRIMARY HYPERTENSION: ICD-10-CM

## 2025-05-19 LAB
ANION GAP SERPL CALC-SCNC: 7 MMOL/L (ref 0–18)
BASOPHILS # BLD AUTO: 0.03 X10(3) UL (ref 0–0.2)
BASOPHILS NFR BLD AUTO: 0.5 %
BUN BLD-MCNC: 16 MG/DL (ref 9–23)
BUN/CREAT SERPL: 15.7 (ref 10–20)
CALCIUM BLD-MCNC: 9.7 MG/DL (ref 8.7–10.4)
CHLORIDE SERPL-SCNC: 98 MMOL/L (ref 98–112)
CO2 SERPL-SCNC: 25 MMOL/L (ref 21–32)
CREAT BLD-MCNC: 1.02 MG/DL (ref 0.7–1.3)
DEPRECATED RDW RBC AUTO: 41.1 FL (ref 35.1–46.3)
EGFRCR SERPLBLD CKD-EPI 2021: 78 ML/MIN/1.73M2 (ref 60–?)
EOSINOPHIL # BLD AUTO: 0.18 X10(3) UL (ref 0–0.7)
EOSINOPHIL NFR BLD AUTO: 3.1 %
ERYTHROCYTE [DISTWIDTH] IN BLOOD BY AUTOMATED COUNT: 13.5 % (ref 11–15)
FASTING STATUS PATIENT QL REPORTED: NO
GLUCOSE BLD-MCNC: 175 MG/DL (ref 70–99)
HCT VFR BLD AUTO: 25.2 % (ref 39–53)
HGB BLD-MCNC: 8.8 G/DL (ref 13–17.5)
IMM GRANULOCYTES # BLD AUTO: 0.02 X10(3) UL (ref 0–1)
IMM GRANULOCYTES NFR BLD: 0.3 %
LYMPHOCYTES # BLD AUTO: 1.07 X10(3) UL (ref 1–4)
LYMPHOCYTES NFR BLD AUTO: 18.2 %
MCH RBC QN AUTO: 31 PG (ref 26–34)
MCHC RBC AUTO-ENTMCNC: 34.9 G/DL (ref 31–37)
MCV RBC AUTO: 88.7 FL (ref 80–100)
MONOCYTES # BLD AUTO: 0.67 X10(3) UL (ref 0.1–1)
MONOCYTES NFR BLD AUTO: 11.4 %
NEUTROPHILS # BLD AUTO: 3.9 X10 (3) UL (ref 1.5–7.7)
NEUTROPHILS # BLD AUTO: 3.9 X10(3) UL (ref 1.5–7.7)
NEUTROPHILS NFR BLD AUTO: 66.5 %
OSMOLALITY SERPL CALC.SUM OF ELEC: 275 MOSM/KG (ref 275–295)
PLATELET # BLD AUTO: 300 10(3)UL (ref 150–450)
POTASSIUM SERPL-SCNC: 5 MMOL/L (ref 3.5–5.1)
RBC # BLD AUTO: 2.84 X10(6)UL (ref 3.8–5.8)
SODIUM SERPL-SCNC: 130 MMOL/L (ref 136–145)
WBC # BLD AUTO: 5.9 X10(3) UL (ref 4–11)

## 2025-05-19 PROCEDURE — 36415 COLL VENOUS BLD VENIPUNCTURE: CPT

## 2025-05-19 PROCEDURE — 80048 BASIC METABOLIC PNL TOTAL CA: CPT

## 2025-05-19 PROCEDURE — 85025 COMPLETE CBC W/AUTO DIFF WBC: CPT

## 2025-05-21 VITALS — WEIGHT: 131 LBS | BODY MASS INDEX: 23.21 KG/M2 | HEIGHT: 63 IN

## 2025-05-21 RX ORDER — MULTIVIT-MIN/IRON FUM/FOLIC AC 7.5 MG-4
1 TABLET ORAL DAILY
COMMUNITY

## 2025-05-30 ENCOUNTER — HOSPITAL ENCOUNTER (OUTPATIENT)
Dept: INTERVENTIONAL RADIOLOGY/VASCULAR | Facility: HOSPITAL | Age: 75
Discharge: HOME | End: 2025-05-30
Attending: SURGERY | Admitting: SURGERY
Payer: MEDICARE

## 2025-05-30 VITALS
DIASTOLIC BLOOD PRESSURE: 74 MMHG | TEMPERATURE: 98 F | WEIGHT: 136 LBS | RESPIRATION RATE: 21 BRPM | SYSTOLIC BLOOD PRESSURE: 157 MMHG | BODY MASS INDEX: 24.1 KG/M2 | HEIGHT: 63 IN | HEART RATE: 83 BPM | OXYGEN SATURATION: 99 %

## 2025-05-30 DIAGNOSIS — I73.9 PAD (PERIPHERAL ARTERY DISEASE): ICD-10-CM

## 2025-05-30 LAB — GLUCOSE BLDC GLUCOMTR-MCNC: 125 MG/DL (ref 70–99)

## 2025-05-30 PROCEDURE — 37221 HC TRANSLUMINAL ANGIOPLASTY W STENT ILIAC UNILAT INIT: CPT | Performed by: SURGERY

## 2025-05-30 PROCEDURE — 99153 MOD SED SAME PHYS/QHP EA: CPT | Performed by: SURGERY

## 2025-05-30 PROCEDURE — 99152 MOD SED SAME PHYS/QHP 5/>YRS: CPT | Performed by: SURGERY

## 2025-05-30 PROCEDURE — 82962 GLUCOSE BLOOD TEST: CPT

## 2025-05-30 RX ORDER — PROTAMINE SULFATE 10 MG/ML
INJECTION, SOLUTION INTRAVENOUS
Status: COMPLETED
Start: 2025-05-30 | End: 2025-05-30

## 2025-05-30 RX ORDER — LIDOCAINE HYDROCHLORIDE 20 MG/ML
INJECTION, SOLUTION EPIDURAL; INFILTRATION; INTRACAUDAL; PERINEURAL
Status: COMPLETED
Start: 2025-05-30 | End: 2025-05-30

## 2025-05-30 RX ORDER — SODIUM CHLORIDE 9 MG/ML
INJECTION, SOLUTION INTRAVENOUS CONTINUOUS
Status: DISCONTINUED | OUTPATIENT
Start: 2025-05-30 | End: 2025-05-30

## 2025-05-30 RX ORDER — ACETAMINOPHEN 325 MG/1
650 TABLET ORAL EVERY 4 HOURS PRN
Status: DISCONTINUED | OUTPATIENT
Start: 2025-05-30 | End: 2025-05-30

## 2025-05-30 RX ORDER — HYDROCODONE BITARTRATE AND ACETAMINOPHEN 5; 325 MG/1; MG/1
1 TABLET ORAL EVERY 4 HOURS PRN
Status: DISCONTINUED | OUTPATIENT
Start: 2025-05-30 | End: 2025-05-30

## 2025-05-30 RX ORDER — MIDAZOLAM HYDROCHLORIDE 1 MG/ML
INJECTION INTRAMUSCULAR; INTRAVENOUS
Status: COMPLETED
Start: 2025-05-30 | End: 2025-05-30

## 2025-05-30 RX ORDER — IOPAMIDOL 612 MG/ML
100 INJECTION, SOLUTION INTRAVASCULAR
Status: COMPLETED | OUTPATIENT
Start: 2025-05-30 | End: 2025-05-30

## 2025-05-30 RX ORDER — HYDROCODONE BITARTRATE AND ACETAMINOPHEN 5; 325 MG/1; MG/1
2 TABLET ORAL EVERY 4 HOURS PRN
Status: DISCONTINUED | OUTPATIENT
Start: 2025-05-30 | End: 2025-05-30

## 2025-05-30 RX ORDER — HEPARIN SODIUM 1000 [USP'U]/ML
INJECTION, SOLUTION INTRAVENOUS; SUBCUTANEOUS
Status: COMPLETED
Start: 2025-05-30 | End: 2025-05-30

## 2025-05-30 RX ADMIN — IOPAMIDOL 35 ML: 612 INJECTION, SOLUTION INTRAVASCULAR at 08:40:00

## 2025-05-30 NOTE — OPERATIVE REPORT
Vascular Surgery Cath Lab Operative Note  Patients Name:  Nic Leon  Operating Physician: Alee Washington MD  Location:  Cath Lab  MRN:   [id number]                                            YOB: 1950    Operation Date:  May 30, 2025        Pre-Operative Diagnosis:   Atherosclerosis with right common iliac dissection     Post-Operative Diagnosis:   Same     Procedure Performed:   Ultrasound guided access of the bilateral common femoral arteries  Aortogram, iliac angiogram   Angioplasty and stenting of the bilateral common iliac arteries with 9x59 VBX bilaterally   Radiographic supervision and interpretation    Anesthesia:   Start Time: 0810  Finish Time: 0839  The patient was sedated and monitored by an independent practitioner under my supervision  3mg Versed 100mcg Fentanyl     EBL: Minimal      Complications: None apparent     Findings: Dissection flap of the right common iliac artery causing severe stenosis.  Widely patent following intervention.  DP pulses palpable bilaterally.    Indication for Procedure: Nic is a 74-year-old male with severe coronary disease.  He recently underwent a PCI in which he had a dissection of the common iliac artery.  He presents for kissing iliac stents in anticipation of a repeat procedure.  Risks and benefits of the procedure were discussed with the patient he elected to proceed.    Description of Procedure: The patient was brought to the operating room, he was placed upon on the operating table.  He was sedated and monitored by an independent practitioner under my supervision.  His bilateral groins were prepped and draped in the usual sterile fashion.  Under ultrasound guidance 1% lidocaine was infiltrated above the bilateral common femoral arteries.  The common femoral arteries were then accessed using a micropuncture needle, wire and sheath.  Access site angiogram was performed bilaterally which showed placement in the mid common femoral artery.   The micropuncture sheath were exchanged for a 5 [place] sheath followed by a 7 [place] Brite tip sheath.  The patient was then fully heparinized.  A glide advantage wires were placed and an aortogram was performed through the Brite tip sheath on the right.  The previously selected stents were then placed and positioned at the bifurcation.  They were simultaneously deployed in a kissing fashion.  There was good resolution of the area of stenosis.  Completion angiography confirmed widely patent common iliac arteries bilaterally with brisk flow through the external and hypogastric arteries.  At this point the patient was felt to be maximally revascularized.  All wires and catheters were removed.  The 7 [place] sheaths were exchanged for a short 7 [place] sheaths.  A 6 7 minx device was used in each groin to close the arteriotomy.  Manual pressure was held and a sterile dressing was applied.  Protamine was given to reverse the effects of heparin.  The patient tolerated the procedure well, there were no immediate complications.  He was taken to the recovery room in good condition.     Plan: Bedrest x2 hours   Continue ASA and plavix   Follow-up in one month        Alee Washington MD  05/30/25  8:45 AM

## 2025-05-30 NOTE — DISCHARGE INSTRUCTIONS
HOME CARE INSTRUCTIONS FOLLOWING PERIPHERAL ANGIOGRAPHY, ANGIOPLASTY (PTCA/PTA) OR INSERTION OF STENT IN THE PERIPHERAL ARTERIES      Activity     DO NOT drive after the procedure.  You may resume driving late the following day according to the nurse or physician's instructions  Plan on resting and relaxing tonight and tomorrow  Resume your normal activity after 48 hours, or as instructed by your physician  Do not lift anything over 10 pounds for 1 WEEK  Avoid repeated stair use and excessive walking for the next 24 hours.  Avoid repetitive squatting/bending exercises/motions for 1 week.   Avoid drinking alcohol for the next 24 hours      What is Normal?    A small lump at the procedure site associated with mild tenderness when touched  The procedure site may be bruised or discolored  There may be a small amount of drainage on the bandage    Special Instructions    Drink plenty of fluids during the next 24 hours to \"flush\" the contrast from your system  Keep the bandage clean and dry   After 24 hours, remove the bandage   You may shower after removing the bandage, and wash the procedure site gently with soap and water  Do NOT apply any powders, ointments or creams to the site for 1 week.   DO NOT submerge the procedure site for 1 WEEK (no bath tubs or pools)  If you choose to wear a bandage for a few days, make sure it remains clean and dry and that it is changed daily    Additional Instructions:  Do not take glucophage/metformin containing products for at least 48 hours after procedure, unless otherwise directed by your physician.    When you should NOTIFY YOUR PHYSICIAN    Bleeding can occur at the procedure site - both on the outside of the skin and/or beneath the surface of the skin  Swelling or a large lump at the procedure site can occur, which may be accompanied by moderate to severe pain    If either of the above occurs, lie down flat.    Have someone apply pressure to the procedure site with both hands, as  instructed by the nurse.    Hold pressure for 20 minutes and the bleeding should stop.    Notify your physician of the occurrence  If the bleeding does not stop, call 911 and continue to apply pressure    If you experience signs of a fever, temperature > 101°, chills, infection (redness, swelling, thick yellow drainage, or a foul odor from the procedure site)  If you notice any numbness, tingling, or loss of feeling to your leg or foot or groin access      You Received a Stent:    You will remain on an antiplatelet drug and/or aspirin.  These medications help to prevent blockage at the stent site.  If another physician or dentist asks you to stop your antiplatelet medication, you need to consult your doctor first.  Together, your physician can discuss the risks that may be involved if you are not taking the antiplatelet medication   If an MRI is necessary, it may be done 4-6 weeks after your procedure.  Verify this with your doctor  Keep your stent card with you at all times!  If you need an MRI in the future, your stent card will need to be shown to the technologist before performing the MRI.  A duplicate card CANNOT be reproduced.    Other    You may resume your present diet, unless otherwise specified by your physician.  You may resume all of your medications as prescribed, unless otherwise directed by your physician.  A list of your medications was provided to you at discharge.    Closure device utilized?  Yes (see additional instructions below)  If yes, type of closure used:    Mynx   Additional Instructions:  Leave the dressing/band-aid over the site for 24 hours.   You may feel a \"pea or olive pit\" sized lumpand/or note mild tenderness in the groin area for approximately 5-7 days.         The collagen will be resorbed (broken down) by your body in approximately 6-12 weeks.  After you shower, apply a freshband-aid to the site and change if it becomes soiled or wet.         Change band-aid daily until the skin  heals.

## 2025-05-30 NOTE — IVS NOTE
DISCHARGE NOTE     Pt is able to sit up and ambulate without difficulty.   Pt voided and tolerated fluids and food.   Procedural site remains dry and intact with good circulation, motion, and sensation.   No signs and symptoms of bleeding/hematoma noted.   IV access removed  Instruction provided, patient/family verbalizes understanding.   Dr. Washington spoke with patient/family post procedure.     Pt discharge via wheelchair to [facility]       Follow up Appointment: fiona in 1 month    New Prescription: none - already on asa and plavix.

## 2025-06-02 NOTE — H&P
The H&P from 25 was reviewed by myself on 25 , and there has been no significant interval change. The procedure was discussed with the patient and/or legal representative including the potential risks, benefits, and side effects. Reasonable alternatives to the procedure were discussed. An opportunity to ask questions was given and they were answered to their satisfaction.    Alee Washington MD  25   1:34 PM                           VASCULAR SURGERY   CLINIC CONSULT NOTE           Name: Nic Leon   :   1950  [id number]      REFERRING PHYSICIAN:  Lyndon Self Referred  PRIMARY CARE PHYSICIAN:  No primary care provider on file.     HISTORY OF PRESENT ILLNESS: Nic is a 74 year old male with a history of CAD who has been referred regarding findings on a CT scan from a recent hospital stay. Patient underwent PCI on  with Dr. Lockhart. This was done via right femoral access. He developed a groin hematoma and had a drop in his hemoglobin while in the hospital. Due to this, a CTA of the abdomen/pelvis was performed to rule out retroperitoneal hematoma. This scan identified a focal dissection vs soft plaque in the right common iliac artery resulting in luminal narrowing. Patient was referred to vascular surgery for further evaluation. He will need repeat PCI at the end of . He is currently on aspirin, plavix and a statin. He recently quit smoking. He complains of occasional right buttock claudication.      PAST MEDICAL HISTORY:    History reviewed. No pertinent past medical history.     PAST SURGICAL HISTORY:   History reviewed. No pertinent surgical history.      MEDICATIONS:      Current Outpatient Medications:     clopidogrel 75 MG Oral Tab, Take 75 mg by mouth daily., Disp: , Rfl:     glipiZIDE 5 MG Oral Tab, Take 5 mg by mouth 2 (two) times daily before meals., Disp: , Rfl:     latanoprost 0.005 % Ophthalmic Solution, nightly., Disp: , Rfl:     losartan 100 MG Oral Tab, Take 100 mg by mouth  daily., Disp: , Rfl:     rosuvastatin 20 MG Oral Tab, Take 20 mg by mouth nightly., Disp: , Rfl:     testosterone cypionate 200 MG/ML Intramuscular Solution, Inject 230 mg into the muscle every 14 (fourteen) days., Disp: , Rfl:     Cholecalciferol (VITAMIN D) 25 MCG (1000 UT) Oral Tab, Take 1,000 Units by mouth daily., Disp: , Rfl:     docusate sodium 100 MG Oral Cap, Take 100 mg by mouth 2 (two) times daily., Disp: , Rfl:     ferrous sulfate 325 (65 FE) MG Oral Tab EC, Take 325 mg by mouth daily with breakfast., Disp: , Rfl:     aspirin 81 MG Oral Tab EC, Take 81 mg by mouth daily., Disp: , Rfl:     metFORMIN 500 MG Oral Tab, Take 500 mg by mouth 2 (two) times daily with meals., Disp: , Rfl:      ALLERGIES:    He has no allergies on file.     SOCIAL HISTORY:    Patient  reports that he has quit smoking. His smoking use included cigarettes. He has been exposed to tobacco smoke. He has quit using smokeless tobacco.     FAMILY HISTORY:    Patient's family history is not on file.     ROS:     A 12 point review of systems with pertinent positives and negatives listed in the HPI.     EXAM:  Ht 5' 3\" (1.6 m)   Wt 127 lb (57.6 kg)   BMI 22.50 kg/m²   GENERAL: Alert and oriented x3, in no apparent distress  PSYCH: Normal mood and affect  HEENT: Ears and throat are clear  NECK: Supple  RESPIRATORY: Normal work of breathing   CARDIO: RRR  ABDOMEN: Soft, non-tender  BACK: Normal, no tenderness  SKIN: No rashes, warm and dry  EXTREMITIES: No edema   NEURO: No sensory or motor deficits  VASCULAR: Left DP palpable, right non-palp     IMAGING: CTA A/P 5/15/25  1. Small right groin hematoma and ill-defined edema related to hematoma in right thigh.  Small amount of retroperitoneal pelvic hematoma.  No large hematoma , active contrast extravasation or pseudoaneurysm.   2. Generalized atherosclerosis.  Focal dissection right common iliac artery with moderate luminal narrowing.   3. Emphysema with mild interstitial fibrosis in lower  lungs.       ASSESSMENT: Mr. Leon is a 75 y/o male who presents for evaluation of right iliac dissection with stenosis. Discussed with patient and his son; given that he will need a repeat PCI, will plan for right, possible left iliac stent placement. We discussed the risks and benefits and he elects to proceed. Patient will not need to stop any of his antiplatelet medications for the procedure. Will plan for this next week Friday.      Diagnoses and all orders for this visit:     PAD (peripheral artery disease) ([facility])  -     OFFICE/OUTPATIENT NEW [facility] MDM 45 MINUTES        PLAN:  Right, possible left iliac stent placement - EL      The patient indicated an understanding of these issues and agreed to the plan and all questions were answered during the clinic visit.       Thank you for allowing me to participate in your patient's care.   Please do not hesitate to contact me with any questions.        Sincerely,  Alee Washington MD  [facility]  Vascular and Endovascular Surgery

## 2025-06-07 ENCOUNTER — APPOINTMENT (OUTPATIENT)
Dept: GENERAL RADIOLOGY | Facility: HOSPITAL | Age: 75
End: 2025-06-07
Attending: EMERGENCY MEDICINE
Payer: MEDICARE

## 2025-06-07 ENCOUNTER — APPOINTMENT (OUTPATIENT)
Dept: CT IMAGING | Facility: HOSPITAL | Age: 75
End: 2025-06-07
Attending: EMERGENCY MEDICINE
Payer: MEDICARE

## 2025-06-07 ENCOUNTER — APPOINTMENT (OUTPATIENT)
Dept: ULTRASOUND IMAGING | Facility: HOSPITAL | Age: 75
End: 2025-06-07
Attending: EMERGENCY MEDICINE
Payer: MEDICARE

## 2025-06-07 ENCOUNTER — HOSPITAL ENCOUNTER (INPATIENT)
Facility: HOSPITAL | Age: 75
LOS: 1 days | Discharge: HOME | End: 2025-06-08
Attending: EMERGENCY MEDICINE | Admitting: INTERNAL MEDICINE
Payer: MEDICARE

## 2025-06-07 DIAGNOSIS — I72.4 FEMORAL ARTERY PSEUDO-ANEURYSM, LEFT: ICD-10-CM

## 2025-06-07 DIAGNOSIS — E87.1 HYPONATREMIA: ICD-10-CM

## 2025-06-07 DIAGNOSIS — E87.70 HYPERVOLEMIA, UNSPECIFIED HYPERVOLEMIA TYPE: Primary | ICD-10-CM

## 2025-06-07 DIAGNOSIS — I77.72 ILIAC ARTERY DISSECTION (HCC): ICD-10-CM

## 2025-06-07 DIAGNOSIS — R79.89 ELEVATED TROPONIN: ICD-10-CM

## 2025-06-07 LAB
ALBUMIN SERPL-MCNC: 4.3 G/DL (ref 3.2–4.8)
ALBUMIN/GLOB SERPL: 2 {RATIO} (ref 1–2)
ALP LIVER SERPL-CCNC: 64 U/L (ref 45–117)
ALT SERPL-CCNC: 10 U/L (ref 10–49)
ANION GAP SERPL CALC-SCNC: 10 MMOL/L (ref 0–18)
ANTIBODY SCREEN: NEGATIVE
APTT PPP: 36.2 SECONDS (ref 23–36)
AST SERPL-CCNC: 14 U/L (ref ?–34)
BASOPHILS # BLD AUTO: 0.05 X10(3) UL (ref 0–0.2)
BASOPHILS NFR BLD AUTO: 0.6 %
BILIRUB SERPL-MCNC: 0.4 MG/DL (ref 0.2–1.1)
BUN BLD-MCNC: 12 MG/DL (ref 9–23)
BUN/CREAT SERPL: 11.2 (ref 10–20)
CALCIUM BLD-MCNC: 9.3 MG/DL (ref 8.7–10.4)
CHLORIDE SERPL-SCNC: 95 MMOL/L (ref 98–112)
CHOLEST SERPL-MCNC: 99 MG/DL (ref ?–200)
CK SERPL-CCNC: 38 U/L (ref 46–171)
CO2 SERPL-SCNC: 24 MMOL/L (ref 21–32)
CREAT BLD-MCNC: 1.07 MG/DL (ref 0.7–1.3)
CRP SERPL-MCNC: 5.8 MG/DL (ref ?–1)
DEPRECATED RDW RBC AUTO: 52.4 FL (ref 35.1–46.3)
EGFRCR SERPLBLD CKD-EPI 2021: 73 ML/MIN/1.73M2 (ref 60–?)
EOSINOPHIL # BLD AUTO: 0.16 X10(3) UL (ref 0–0.7)
EOSINOPHIL NFR BLD AUTO: 2.1 %
ERYTHROCYTE [DISTWIDTH] IN BLOOD BY AUTOMATED COUNT: 15.8 % (ref 11–15)
ERYTHROCYTE [SEDIMENTATION RATE] IN BLOOD: 57 MM/HR (ref 0–20)
GLOBULIN PLAS-MCNC: 2.2 G/DL (ref 2–3.5)
GLUCOSE BLD-MCNC: 147 MG/DL (ref 70–99)
HCT VFR BLD AUTO: 25.3 % (ref 39–53)
HDLC SERPL-MCNC: 27 MG/DL (ref 40–59)
HGB BLD-MCNC: 8.7 G/DL (ref 13–17.5)
IMM GRANULOCYTES # BLD AUTO: 0.02 X10(3) UL (ref 0–1)
IMM GRANULOCYTES NFR BLD: 0.3 %
LDLC SERPL CALC-MCNC: 49 MG/DL (ref ?–100)
LYMPHOCYTES # BLD AUTO: 0.91 X10(3) UL (ref 1–4)
LYMPHOCYTES NFR BLD AUTO: 11.8 %
MCH RBC QN AUTO: 31.5 PG (ref 26–34)
MCHC RBC AUTO-ENTMCNC: 34.4 G/DL (ref 31–37)
MCV RBC AUTO: 91.7 FL (ref 80–100)
MONOCYTES # BLD AUTO: 1.03 X10(3) UL (ref 0.1–1)
MONOCYTES NFR BLD AUTO: 13.3 %
NEUTROPHILS # BLD AUTO: 5.55 X10 (3) UL (ref 1.5–7.7)
NEUTROPHILS # BLD AUTO: 5.55 X10(3) UL (ref 1.5–7.7)
NEUTROPHILS NFR BLD AUTO: 71.9 %
NONHDLC SERPL-MCNC: 72 MG/DL (ref ?–130)
NT-PROBNP SERPL-MCNC: 819 PG/ML (ref ?–125)
OSMOLALITY SERPL CALC.SUM OF ELEC: 270 MOSM/KG (ref 275–295)
OSMOLALITY UR: 190 MOSM/KG (ref 300–1300)
PLATELET # BLD AUTO: 358 10(3)UL (ref 150–450)
POTASSIUM SERPL-SCNC: 4.7 MMOL/L (ref 3.5–5.1)
PROT SERPL-MCNC: 6.5 G/DL (ref 5.7–8.2)
RBC # BLD AUTO: 2.76 X10(6)UL (ref 3.8–5.8)
RH BLOOD TYPE: NEGATIVE
RH BLOOD TYPE: NEGATIVE
SODIUM SERPL-SCNC: 129 MMOL/L (ref 136–145)
SODIUM SERPL-SCNC: 36 MMOL/L
TRIGL SERPL-MCNC: 124 MG/DL (ref 30–149)
TROPONIN I SERPL HS-MCNC: 137 NG/L (ref ?–53)
TROPONIN I SERPL HS-MCNC: 139 NG/L (ref ?–53)
VLDLC SERPL CALC-MCNC: 18 MG/DL (ref 0–30)
WBC # BLD AUTO: 7.7 X10(3) UL (ref 4–11)

## 2025-06-07 PROCEDURE — 71045 X-RAY EXAM CHEST 1 VIEW: CPT | Performed by: EMERGENCY MEDICINE

## 2025-06-07 PROCEDURE — 70491 CT SOFT TISSUE NECK W/DYE: CPT | Performed by: EMERGENCY MEDICINE

## 2025-06-07 PROCEDURE — 99223 1ST HOSP IP/OBS HIGH 75: CPT | Performed by: INTERNAL MEDICINE

## 2025-06-07 PROCEDURE — 93971 EXTREMITY STUDY: CPT | Performed by: EMERGENCY MEDICINE

## 2025-06-07 PROCEDURE — 73706 CT ANGIO LWR EXTR W/O&W/DYE: CPT | Performed by: EMERGENCY MEDICINE

## 2025-06-07 PROCEDURE — 71260 CT THORAX DX C+: CPT | Performed by: EMERGENCY MEDICINE

## 2025-06-07 RX ORDER — HYDROCODONE BITARTRATE AND ACETAMINOPHEN 5; 325 MG/1; MG/1
1 TABLET ORAL ONCE
Refills: 0 | Status: COMPLETED | OUTPATIENT
Start: 2025-06-07 | End: 2025-06-07

## 2025-06-07 RX ORDER — MULTIVITAMIN WITH IRON
2500 TABLET ORAL EVERY OTHER DAY
Status: DISCONTINUED | OUTPATIENT
Start: 2025-06-08 | End: 2025-06-08

## 2025-06-07 RX ORDER — ASPIRIN 81 MG/1
324 TABLET, CHEWABLE ORAL ONCE
Status: COMPLETED | OUTPATIENT
Start: 2025-06-07 | End: 2025-06-07

## 2025-06-07 RX ORDER — FERROUS SULFATE 325(65) MG
325 TABLET, DELAYED RELEASE (ENTERIC COATED) ORAL
Status: DISCONTINUED | OUTPATIENT
Start: 2025-06-08 | End: 2025-06-08

## 2025-06-07 RX ORDER — CHOLECALCIFEROL (VITAMIN D3) 25 MCG
1000 TABLET ORAL DAILY
Status: DISCONTINUED | OUTPATIENT
Start: 2025-06-08 | End: 2025-06-08

## 2025-06-07 RX ORDER — NICOTINE POLACRILEX 4 MG
30 LOZENGE BUCCAL
Status: DISCONTINUED | OUTPATIENT
Start: 2025-06-07 | End: 2025-06-08

## 2025-06-07 RX ORDER — LOSARTAN POTASSIUM 100 MG/1
100 TABLET ORAL DAILY
Status: DISCONTINUED | OUTPATIENT
Start: 2025-06-08 | End: 2025-06-08

## 2025-06-07 RX ORDER — NICOTINE POLACRILEX 4 MG
15 LOZENGE BUCCAL
Status: DISCONTINUED | OUTPATIENT
Start: 2025-06-07 | End: 2025-06-08

## 2025-06-07 RX ORDER — HYDROCODONE BITARTRATE AND ACETAMINOPHEN 5; 325 MG/1; MG/1
1 TABLET ORAL EVERY 4 HOURS PRN
Status: DISCONTINUED | OUTPATIENT
Start: 2025-06-07 | End: 2025-06-08

## 2025-06-07 RX ORDER — HYDROCODONE BITARTRATE AND ACETAMINOPHEN 5; 325 MG/1; MG/1
2 TABLET ORAL EVERY 4 HOURS PRN
Status: DISCONTINUED | OUTPATIENT
Start: 2025-06-07 | End: 2025-06-08

## 2025-06-07 RX ORDER — CYANOCOBALAMIN (VITAMIN B-12) 2500 MCG
1 TABLET, SUBLINGUAL SUBLINGUAL EVERY OTHER DAY
COMMUNITY

## 2025-06-07 RX ORDER — HEPARIN SODIUM AND DEXTROSE 10000; 5 [USP'U]/100ML; G/100ML
INJECTION INTRAVENOUS CONTINUOUS
Status: DISCONTINUED | OUTPATIENT
Start: 2025-06-08 | End: 2025-06-08

## 2025-06-07 RX ORDER — FOLIC ACID 0.8 MG
800 TABLET ORAL DAILY
COMMUNITY

## 2025-06-07 RX ORDER — DOCUSATE SODIUM 100 MG/1
100 CAPSULE, LIQUID FILLED ORAL 2 TIMES DAILY
Status: DISCONTINUED | OUTPATIENT
Start: 2025-06-08 | End: 2025-06-08

## 2025-06-07 RX ORDER — FOLIC ACID 0.4 MG
800 TABLET ORAL DAILY
Status: DISCONTINUED | OUTPATIENT
Start: 2025-06-08 | End: 2025-06-08

## 2025-06-07 RX ORDER — DEXTROSE MONOHYDRATE 25 G/50ML
50 INJECTION, SOLUTION INTRAVENOUS
Status: DISCONTINUED | OUTPATIENT
Start: 2025-06-07 | End: 2025-06-08

## 2025-06-07 RX ORDER — CLOPIDOGREL BISULFATE 75 MG/1
75 TABLET ORAL DAILY
Status: DISCONTINUED | OUTPATIENT
Start: 2025-06-08 | End: 2025-06-08

## 2025-06-07 RX ORDER — ACETAMINOPHEN 500 MG
500 TABLET ORAL EVERY 4 HOURS PRN
Status: DISCONTINUED | OUTPATIENT
Start: 2025-06-07 | End: 2025-06-08

## 2025-06-07 RX ORDER — ROSUVASTATIN CALCIUM 20 MG/1
20 TABLET, COATED ORAL NIGHTLY
Status: DISCONTINUED | OUTPATIENT
Start: 2025-06-08 | End: 2025-06-08

## 2025-06-07 RX ORDER — DOXEPIN HYDROCHLORIDE 50 MG/1
1 CAPSULE ORAL DAILY
Status: DISCONTINUED | OUTPATIENT
Start: 2025-06-08 | End: 2025-06-08

## 2025-06-07 RX ORDER — ASPIRIN 81 MG/1
81 TABLET ORAL DAILY
Status: DISCONTINUED | OUTPATIENT
Start: 2025-06-08 | End: 2025-06-08

## 2025-06-07 RX ORDER — ACETAMINOPHEN 325 MG/1
650 TABLET ORAL EVERY 4 HOURS PRN
Status: DISCONTINUED | OUTPATIENT
Start: 2025-06-07 | End: 2025-06-08

## 2025-06-07 RX ORDER — HEPARIN SODIUM AND DEXTROSE 10000; 5 [USP'U]/100ML; G/100ML
18 INJECTION INTRAVENOUS ONCE
Status: COMPLETED | OUTPATIENT
Start: 2025-06-07 | End: 2025-06-08

## 2025-06-07 RX ORDER — LATANOPROST 50 UG/ML
1 SOLUTION/ DROPS OPHTHALMIC NIGHTLY
Status: DISCONTINUED | OUTPATIENT
Start: 2025-06-08 | End: 2025-06-08

## 2025-06-07 NOTE — ED PROVIDER NOTES
Patient Seen in: [facility] Emergency Department    History     Chief Complaint   Patient presents with    Chest Pain Angina     Stated Complaint: Jaw/shoulder/leg pain    HPI    74-year-old male with past medical history of diabetes, hypertension, dyslipidemia presenting with son for evaluation of left lower jaw and shoulder pain in addition to left lower extremity pain since this morning.  No chest pain or shortness of breath, no exertional complaints.  No focal weakness or paresthesias.  No back or abdominal pain.  No tearing sensation.  On aspirin/Plavix in the setting of recent abnormal stress tests and 5/9 LHC via right radial artery notable for multivessel  in addition to proximal LAD stenosis with bicuspid aortic valve/stenosis for which CT surgery consultation obtained down patient/family proceeding with PCI/staged intervention status post 5/13 LAD CAREY via right groin.  Patient thereafter with right groin hematoma, sonography without pseudoaneurysm event with downtrending hemoglobin for which CT obtained and notable for right groin/thigh hematoma in addition to retroperitoneal hematoma, incidentally with right common iliac dissection for which patient now status post 5/30 bilateral common iliac angioplasty and stenting.      Past Medical History[1]    Past Surgical History[2]         Family History[3]    Short Social Hx on File[4]    Review of Systems :  Constitutional: As per HPI  HENT: Negative for ear pain and rhinorrhea.  Left submandibular pain.  Eyes: Negative for discharge and visual disturbance.   Respiratory: Negative for cough and shortness of breath.    Cardiovascular: Negative for chest pain and palpitations.   Gastrointestinal: Negative for vomiting and abdominal pain.   Genitourinary: Negative for dysuria and hematuria.   Musculoskeletal: Negative for joint swelling and arthralgias.  Positive left shoulder/leg pain.      Positive for stated complaint:   Other systems are as noted in  HPI.  Constitutional and vital signs reviewed.      All other systems reviewed and negative except as noted above.    PSFH elements reviewed from today and agreed except as otherwise stated in HPI.    Physical Exam     ED Triage Vitals [06/07/25 1743]   /64   Pulse 86   Resp 20   Temp 99 °F (37.2 °C)   Temp src Temporal   SpO2 96 %   O2 Device None (Room air)       Current:/64   Pulse 86   Temp 99 °F (37.2 °C) (Temporal)   Resp 20   Wt 79.4 kg   SpO2 96%   BMI 31.00 kg/m²         Physical Exam   Constitutional: No distress.   HEENT: Dry MM.  Last submandibular tenderness without overt crepitance/fluctuance.  No tongue elevation.  No drooling or stridor.  Head: Normocephalic.   Eyes: No injection.   Neck: Neck supple.  No crepitance.  Cardiovascular: RRR.  Upper extremities with 2+ radial pulses.  Lower extremities with 2+ DP/PT pulses.  Pulmonary/Chest: Effort normal. CTAB.  Abdominal: Soft.  Nontender.  Musculoskeletal: No gross deformity.  Left shoulder pain/tenderness with movement against resistance and particular with abduction.  Left groin induration without crepitus/fluctuance or cutaneous change.  Neurological: Alert.  Bilateral upper and lower extremities with 5/5 strength proximally and distally.  Skin: Skin is warm.   Psychiatric: Cooperative.  Nursing note and vitals reviewed.      ED Course     Labs Reviewed   CBC WITH DIFFERENTIAL WITH PLATELET - Abnormal; Notable for the following components:       Result Value    RBC 2.76 (*)     HGB 8.7 (*)     HCT 25.3 (*)     RDW-SD 52.4 (*)     RDW 15.8 (*)     Lymphocyte Absolute 0.91 (*)     Monocyte Absolute 1.03 (*)     All other components within normal limits   COMP METABOLIC PANEL (14) - Abnormal; Notable for the following components:    Glucose 147 (*)     Sodium 129 (*)     Chloride 95 (*)     Calculated Osmolality 270 (*)     All other components within normal limits   TROPONIN I HIGH SENSITIVITY - Abnormal; Notable for the following  components:    Troponin I (High Sensitivity) 137 (*)     All other components within normal limits   SED RATE, WESTERGREN (AUTOMATED) - Abnormal; Notable for the following components:    Sed Rate 57 (*)     All other components within normal limits   C-REACTIVE PROTEIN - Abnormal; Notable for the following components:    C-Reactive Protein 5.80 (*)     All other components within normal limits   CK CREATINE KINASE (NOT CREATININE) - Abnormal; Notable for the following components:    CK 38 (*)     All other components within normal limits   PRO BETA NATRIURETIC PEPTIDE - Abnormal; Notable for the following components:    Pro-Beta Natriuretic Peptide 819 (*)     All other components within normal limits   LIPID PANEL - Abnormal; Notable for the following components:    HDL Cholesterol 27 (*)     All other components within normal limits   TROPONIN I HIGH SENSITIVITY - Abnormal; Notable for the following components:    Troponin I (High Sensitivity) 139 (*)     All other components within normal limits   PTT, ACTIVATED - Abnormal; Notable for the following components:    PTT 36.2 (*)     All other components within normal limits   OSMOLALITY, URINE - Abnormal; Notable for the following components:    Osmolality Urine 190 (*)     All other components within normal limits   SODIUM, URINE, RANDOM   TYPE AND SCREEN    Narrative:     The following orders were created for panel order Type and screen.  Procedure                               Abnormality         Status                     ---------                               -----------         ------                     ABORH (Blood Type)[[id number]]                               In process                 Antibody Screen[[id number]]                                  In process                   Please view results for these tests on the individual orders.   ABORH (BLOOD TYPE)   ANTIBODY SCREEN   ABORH CONFIRMATION   RAINBOW DRAW LAVENDER   RAINBOW DRAW LIGHT GREEN   RAINBOW  DRAW GOLD   RAINBOW DRAW BLUE     EKG    Rate, intervals and axes as noted on EKG Report.  Rate: 91  Rhythm: NSR   Reading: NSR 91 with RBBB unchanged from 5/13/2025 as independently interpreted by myself   EKG (2hr)    Rate, intervals and axes as noted on EKG Report.  Rate: 86   Rhythm: NSR   Reading: NSR 86 with RBBB unchanged from prior as independently interpreted by myself                     US VENOUS DOPPLER LEG LEFT - DIAG IMG (CPT=93971)  Result Date: 6/7/2025  PROCEDURE: US VENOUS DOPPLER LEG LEFT-DIAG IMG (CPT=93971)  COMPARISON: None.  INDICATIONS: Eval for DVT  TECHNIQUE: Color duplex Doppler venous ultrasound of the left lower extremity was performed in the usual manner.  FINDINGS:   There is questionable short-segment nonocclusive DVT at the junction of the greater saphenous and left common femoral veins.  The remaining deep veins of the left lower extremity are widely patent with no DVT.  There are small linear areas of decreased echogenicity anterior to the left superficial femoral artery which may represent a small perivascular hematoma.  The left superficial femoral artery is widely patent.          CONCLUSION:   1. Questionable short-segment nonocclusive DVT at the junction of the greater saphenous and left common femoral vein.  No other DVT is identified in the left lower extremity.  2. Thin linear areas of avascular decreased echogenicity/fluid anterior to the left greater saphenous artery which could represent a small perivascular hematoma.  The underlying artery is widely patent.   Dictated by (CST): Kar Tobias MD on 6/07/2025 at 8:26 PM     Finalized by (CST): Kar Tobias MD on 6/07/2025 at 8:30 PM          XR CHEST AP PORTABLE  (CPT=71045)  Result Date: 6/7/2025  PROCEDURE: XR CHEST AP PORTABLE  (CPT=71045) TIME: 18 18.   COMPARISON: None.  INDICATIONS: Elevated blood pressure and fatigue today.  TECHNIQUE:   Single view.   FINDINGS:   The heart mediastinal structures are  minimally prominent.  Pulmonary vascularity is slightly increased.  The findings are suggestive of slight/early fluid overload.  Lung volumes are slightly diminished.  There is no dense consolidation, sizable effusion, or pneumothorax.           CONCLUSION: Increased pulmonary vascularity suggestive of slight/early fluid overload.    Dictated by (CST): Kar Tobias MD on 6/07/2025 at 6:27 PM     Finalized by (CST): Kar Tobias MD on 6/07/2025 at 6:28 PM        Preliminary Radiology Report  [facility], [facility]  [phone] - Phone    [facility]    NAME: GREGORIA COPELAND    DATE OF EXAM: 06/07/2025  Patient No:  [id number]  Physician:  LARON^HANNA^Zac  YOB: 1950    Past Medical History (entered by Technologist):    Reason For Exam (entered by Technologist):  Left lower extremity pain.  Other Notes (entered by Technologist): POD 4 RM 47    Additional Information (per [facility]):      CTA BILATERAL LOWER EXTREMITIES WITH AND WITHOUT CONTRAST    IMPRESSION:    Bilateral common iliac artery stents.    Long segment nonocclusive dissection left external iliac artery.    7 mm anteriorly directed pseudoaneurysm left CFA/proximal SFA (5/72).  Adjacent hematoma is present.      No arterial occlusion left lower extremity.    No arterial occlusion right lower extremity.    Moderate to severe background atherosclerosis.    No visualized hemoperitoneum or retroperitoneal hematoma.  Incompletely characterized left renal cystic density foci.    Case discussed with Dr. Laron mayes 10:35 PM ET.    Aime Browning MD/PhD         MDM   DIFFERENTIAL DIAGNOSIS: After history and physical exam differential diagnosis includes but is not limited to ACS, myocarditis, DVT, sialoadenitis, sialolithiasis, pelvic arteriopathy.    Pulse ox: 96%:Normal on RA, as independently interpreted by myself    Cardiac Monitor Interpretation:   Pulse Readings from Last 1 Encounters:   06/07/25 86   , sinus,       Medical Decision Making  Evaluation for left-sided jaw pain/swelling without aerodigestive complaints or drooling stridor in addition to seemingly musculoskeletal left shoulder pain without cardiopulmonary/exertional complaints in patient with multiple recent procedures including iliac angioplasty/stenting with complaints of left leg pain without overt neurovascular compromise.  Troponin as noted with unchanged EKG and plateaued for which ASA initiated, left lower extremity sonography concerning for DVT for which CT chest concurrently obtained addition to CTA abdomen/pelvis and former without PE though latter notable for left-sided femoral pseudoaneurysm in addition to iliac artery dissection for which heparin drip without bolus initiated after discussion with vascular/cardiology and admitting service with T&S sent    Problems Addressed:  Elevated troponin: acute illness or injury  Femoral artery pseudo-aneurysm, left: undiagnosed new problem with uncertain prognosis  Hypervolemia, unspecified hypervolemia type: acute illness or injury  Hyponatremia: chronic illness or injury  Iliac artery dissection (HCC): acute illness or injury    Amount and/or Complexity of Data Reviewed  External Data Reviewed: labs, radiology, ECG and notes.     Details: 5/13/2025 EKG, 5/30 and 5/13/2025 vascular and cardiology op notes, 5/15/2025 CTA abd/pelvis, 5/19/2025 CBC reviewed  Labs: ordered. Decision-making details documented in ED Course.  Radiology: ordered and independent interpretation performed. Decision-making details documented in ED Course.     Details: CXR without obvious pneumothorax as independently interpreted by myself  CT chest without obvious pneumothorax as independently interpreted by myself    ECG/medicine tests: ordered and independent interpretation performed. Decision-making details documented in ED Course.  Discussion of management or test interpretation with external provider(s): Case d/w hospitalist   [facility] for admission, LEORA Yee in cardiology consultation, vascular surgery Dr. Webb in consultation    Risk  Prescription drug management.  Drug therapy requiring intensive monitoring for toxicity.  Decision regarding hospitalization.      I was wearing at minimum a facemask and eye protection throughout this encounter with handwashing performed prior and after patient evaluation without personal hand/facial/oropharyngeal contact and gloves worn throughout encounter. See note and/or contact this provider for further PPE details.      Disposition and Plan     Clinical Impression:  1. Hypervolemia, unspecified hypervolemia type    2. Elevated troponin    3. Hyponatremia    4. Iliac artery dissection (HCC)    5. Femoral artery pseudo-aneurysm, left        Disposition:  Admit    Follow-up:  No follow-up provider specified.    Medications Prescribed:  Current Discharge Medication List                   [1]   Past Medical History:   Atherosclerosis of coronary artery    Diabetes (HCC)    Essential hypertension    Hyperlipidemia   [2]   Past Surgical History:  Procedure Laterality Date    Drug-eluting stents, single Bilateral 05/30/2025    iliac stent    Heart surgery  05/13/2025    Cardiac stent   [3] No family history on file.  [4]   Social History  Socioeconomic History    Marital status: Single   Tobacco Use    Smoking status: Former     Average packs/day: 2.0 packs/day for 40.0 years (80.0 ttl pk-yrs)     Types: Cigarettes     Start date: 4/21/2025    Smokeless tobacco: Current   Vaping Use    Vaping status: Never Used   Substance and Sexual Activity    Alcohol use: Not Currently    Drug use: Never     Social Drivers of Health     Food Insecurity: No Food Insecurity (5/13/2025)    NCSS - Food Insecurity     Worried About Running Out of Food in the Last Year: No     Ran Out of Food in the Last Year: No   Transportation Needs: No Transportation Needs (5/13/2025)    NCSS - Transportation     Lack of  Transportation: No   Housing Stability: Not At Risk (5/13/2025)    NCSS - Housing/Utilities     Has Housing: Yes     Worried About Losing Housing: No     Unable to Get Utilities: No

## 2025-06-07 NOTE — ED INITIAL ASSESSMENT (HPI)
Patient c/o left sided jaw, shoulder and knee pain that started this morning. Denies CP denies SOB. States his BP has been elevated as well. Dr. Leon is accompany the patient.     Recent cardiac stent placed 05/13 and iliac stent placed bilaterally 05/30. Dr. Leon states he still has occluded coronaries that need intervention.

## 2025-06-08 VITALS
DIASTOLIC BLOOD PRESSURE: 52 MMHG | BODY MASS INDEX: 23 KG/M2 | WEIGHT: 130.63 LBS | HEART RATE: 90 BPM | RESPIRATION RATE: 16 BRPM | SYSTOLIC BLOOD PRESSURE: 131 MMHG | OXYGEN SATURATION: 95 % | TEMPERATURE: 98 F

## 2025-06-08 LAB
ALBUMIN SERPL-MCNC: 4.4 G/DL (ref 3.2–4.8)
ALBUMIN/GLOB SERPL: 1.8 {RATIO} (ref 1–2)
ALP LIVER SERPL-CCNC: 69 U/L (ref 45–117)
ALT SERPL-CCNC: 12 U/L (ref 10–49)
ANION GAP SERPL CALC-SCNC: 8 MMOL/L (ref 0–18)
APTT PPP: 179.7 SECONDS (ref 23–36)
AST SERPL-CCNC: 17 U/L (ref ?–34)
BASOPHILS # BLD AUTO: 0.05 X10(3) UL (ref 0–0.2)
BASOPHILS NFR BLD AUTO: 0.5 %
BILIRUB SERPL-MCNC: 0.4 MG/DL (ref 0.2–1.1)
BUN BLD-MCNC: 10 MG/DL (ref 9–23)
BUN/CREAT SERPL: 10 (ref 10–20)
CALCIUM BLD-MCNC: 9 MG/DL (ref 8.7–10.4)
CHLORIDE SERPL-SCNC: 99 MMOL/L (ref 98–112)
CO2 SERPL-SCNC: 22 MMOL/L (ref 21–32)
CREAT BLD-MCNC: 1 MG/DL (ref 0.7–1.3)
DEPRECATED RDW RBC AUTO: 53.2 FL (ref 35.1–46.3)
EGFRCR SERPLBLD CKD-EPI 2021: 79 ML/MIN/1.73M2 (ref 60–?)
EOSINOPHIL # BLD AUTO: 0.07 X10(3) UL (ref 0–0.7)
EOSINOPHIL NFR BLD AUTO: 0.7 %
ERYTHROCYTE [DISTWIDTH] IN BLOOD BY AUTOMATED COUNT: 15.6 % (ref 11–15)
GLOBULIN PLAS-MCNC: 2.4 G/DL (ref 2–3.5)
GLUCOSE BLD-MCNC: 219 MG/DL (ref 70–99)
GLUCOSE BLDC GLUCOMTR-MCNC: 218 MG/DL (ref 70–99)
GLUCOSE BLDC GLUCOMTR-MCNC: 273 MG/DL (ref 70–99)
GLUCOSE BLDC GLUCOMTR-MCNC: 420 MG/DL (ref 70–99)
HCT VFR BLD AUTO: 28.5 % (ref 39–53)
HGB BLD-MCNC: 9.6 G/DL (ref 13–17.5)
IMM GRANULOCYTES # BLD AUTO: 0.04 X10(3) UL (ref 0–1)
IMM GRANULOCYTES NFR BLD: 0.4 %
LYMPHOCYTES # BLD AUTO: 0.65 X10(3) UL (ref 1–4)
LYMPHOCYTES NFR BLD AUTO: 6.7 %
MAGNESIUM SERPL-MCNC: 2 MG/DL (ref 1.6–2.6)
MCH RBC QN AUTO: 31.5 PG (ref 26–34)
MCHC RBC AUTO-ENTMCNC: 33.7 G/DL (ref 31–37)
MCV RBC AUTO: 93.4 FL (ref 80–100)
MONOCYTES # BLD AUTO: 0.87 X10(3) UL (ref 0.1–1)
MONOCYTES NFR BLD AUTO: 9 %
NEUTROPHILS # BLD AUTO: 7.98 X10 (3) UL (ref 1.5–7.7)
NEUTROPHILS # BLD AUTO: 7.98 X10(3) UL (ref 1.5–7.7)
NEUTROPHILS NFR BLD AUTO: 82.7 %
OSMOLALITY SERPL CALC.SUM OF ELEC: 274 MOSM/KG (ref 275–295)
PLATELET # BLD AUTO: 440 10(3)UL (ref 150–450)
POTASSIUM SERPL-SCNC: 4.8 MMOL/L (ref 3.5–5.1)
PROT SERPL-MCNC: 6.8 G/DL (ref 5.7–8.2)
RBC # BLD AUTO: 3.05 X10(6)UL (ref 3.8–5.8)
SODIUM SERPL-SCNC: 129 MMOL/L (ref 136–145)
TROPONIN I SERPL HS-MCNC: 127 NG/L (ref ?–53)
TROPONIN I SERPL HS-MCNC: 146 NG/L (ref ?–53)
TROPONIN I SERPL HS-MCNC: 146 NG/L (ref ?–53)
WBC # BLD AUTO: 9.7 X10(3) UL (ref 4–11)

## 2025-06-08 PROCEDURE — 99239 HOSP IP/OBS DSCHRG MGMT >30: CPT | Performed by: HOSPITALIST

## 2025-06-08 RX ORDER — KETOROLAC TROMETHAMINE 30 MG/ML
30 INJECTION, SOLUTION INTRAMUSCULAR; INTRAVENOUS ONCE
Status: DISCONTINUED | OUTPATIENT
Start: 2025-06-08 | End: 2025-06-08

## 2025-06-08 RX ORDER — TRAMADOL HYDROCHLORIDE 50 MG/1
50 TABLET ORAL EVERY 6 HOURS PRN
Qty: 20 TABLET | Refills: 0 | Status: SHIPPED | OUTPATIENT
Start: 2025-06-08

## 2025-06-08 RX ORDER — TRAMADOL HYDROCHLORIDE 50 MG/1
50 TABLET ORAL EVERY 6 HOURS PRN
Status: DISCONTINUED | OUTPATIENT
Start: 2025-06-08 | End: 2025-06-08

## 2025-06-08 RX ORDER — METOPROLOL TARTRATE 1 MG/ML
2.5 INJECTION, SOLUTION INTRAVENOUS ONCE
Status: COMPLETED | OUTPATIENT
Start: 2025-06-08 | End: 2025-06-08

## 2025-06-08 NOTE — CONSULTS
Nic Leon Patient Status:  Inpatient    1950 MRN [id number]   Location [facility] 3W/SW Attending Neftaly Frank MD   Hosp Day # 1 PCP No primary care provider on file.     Reason for Consultation:  Chest pain     History of Present Illness:  Nic Leon is a a(n) 74 year old male with history of multivessel coronary disease with recent intervention to his LAD with residual  of his right coronary and left circumflex arteries, complicated by right, iliac dissection with recent stenting of bilateral iliac arteries presented to the hospital with complaints of chest pain, left upper extremity pain and left lower extremity pain.  Patient was scheduled to possibly have  intervention of his circumflex and right coronary arteries this month.  However patient has been requesting the delay it due to the presence of hyponatremia.  Patient has CTA of his lower extremities that shows pseudoaneurysm at the common femoral artery of the left.  He also had ultrasound of the lower extremity which shows the possibility of a nonobstructive DVT at the junction of the saphenous and deep vein    History:  Past Medical History[1]  Past Surgical History[2]  Family History[3]   reports that he has quit smoking. His smoking use included cigarettes. He started smoking about 6 weeks ago. He has a 80 pack-year smoking history. He uses smokeless tobacco. He reports that he does not currently use alcohol. He reports that he does not use drugs.    Allergies:  Allergies[4]    Medications:  Current Hospital Medications[5]    Review of Systems:  A comprehensive review of systems was negative if not otherwise mention in above HPI.    /63 (BP Location: Right arm)   Pulse 85   Temp 98.2 °F (36.8 °C) (Oral)   Resp 16   Wt 130 lb 9.6 oz (59.2 kg)   SpO2 95%   BMI 23.13 kg/m²   Temp (24hrs), Av.4 °F (36.9 °C), Min:98.2 °F (36.8 °C), Max:99 °F (37.2 °C)       Intake/Output Summary (Last 24 hours)  at 6/8/2025 0957  Last data filed at 6/7/2025 1921  Gross per 24 hour   Intake 500 ml   Output --   Net 500 ml     Wt Readings from Last 3 Encounters:   06/08/25 130 lb 9.6 oz (59.2 kg)   05/30/25 136 lb (61.7 kg)   05/15/25 127 lb 12.8 oz (58 kg)       Physical Exam:   General: Alert and oriented x 3. No apparent distress. No respiratory or constitutional distress.  HEENT: Normocephalic, anicteric sclera, neck supple.  Neck: No JVD, carotids 2+, no bruits.  Cardiac: Regular rate and rhythm. S1, S2 normal. No murmur, pericardial rub, S3.  Lungs: Clear without wheezes, rales, rhonchi or dullness.  Normal excursions and effort.  Abdomen: Soft, non-tender. BS-present.  Extremities: Without clubbing, cyanosis or edema.  Peripheral pulses are 2+.  Neurologic: Alert and oriented, normal affect.  Skin: Warm and dry.     Laboratory Data:  Lab Results   Component Value Date    WBC 9.7 06/08/2025    HGB 9.6 06/08/2025    HCT 28.5 06/08/2025    .0 06/08/2025    CREATSERUM 1.00 06/08/2025    BUN 10 06/08/2025     06/08/2025    K 4.8 06/08/2025    CL 99 06/08/2025    CO2 22.0 06/08/2025     06/08/2025    CA 9.0 06/08/2025    ALB 4.4 06/08/2025    ALKPHO 69 06/08/2025    BILT 0.4 06/08/2025    TP 6.8 06/08/2025    AST 17 06/08/2025    ALT 12 06/08/2025    .7 06/08/2025    ESRML 57 06/07/2025    CRP 5.80 06/07/2025    MG 2.0 06/08/2025    CK 38 06/07/2025    PGLU 273 06/08/2025         Impression:  Chest pain possible musculoskeletal  NSTEMI, type II  Multivessel coronary artery disease   of the left circumflex and right coronary artery  History of PCI to the LAD  Pseudoaneurysm of the left common femoral artery  Stenting of bilateral common iliac arteries  Hyponatremia  Anemia chronic    Recommendations:  Continue dual antiplatelet therapy.  Continue IV heparin  Vascular surgery on consult regarding pseudoaneurysm left common femoral artery  Continue statin therapy  No evidence of any acute infarct  pattern.  Will continue to discuss  intervention of his circumflex right coronary artery disease, again this was will likely be done as an outpatient  Monitor electrolytes    Thank you for allowing me to participate in the care of your patient.    Felix Fong MD  6/8/2025  9:57 AM       [1]   Past Medical History:   Atherosclerosis of coronary artery    Diabetes (HCC)    Essential hypertension    Hyperlipidemia   [2]   Past Surgical History:  Procedure Laterality Date    Drug-eluting stents, single Bilateral 05/30/2025    iliac stent    Heart surgery  05/13/2025    Cardiac stent   [3] History reviewed. No pertinent family history.  [4] No Known Allergies  [5]   Current Facility-Administered Medications:     heparin (Porcine) 89961 units/250mL infusion PE/DVT/THROMBUS CONTINUOUS, 200-3,000 Units/hr, Intravenous, Continuous    aspirin DR tab 81 mg, 81 mg, Oral, Daily    cholecalciferol (Vitamin D3) tab 1,000 Units, 1,000 Units, Oral, Daily    clopidogrel (Plavix) tab 75 mg, 75 mg, Oral, Daily    cyanocobalamin (Vitamin B12) tab 2,500 mcg, 2,500 mcg, Oral, QOD    docusate sodium (Colace) cap 100 mg, 100 mg, Oral, BID    ferrous sulfate DR tab 325 mg, 325 mg, Oral, Daily with breakfast    folic acid (Folvite) tab 800 mcg, 800 mcg, Oral, Daily    latanoprost (Xalatan) 0.005 % ophthalmic solution 1 drop, 1 drop, Both Eyes, Nightly    losartan (Cozaar) tab 100 mg, 100 mg, Oral, Daily    multivitamin (Tab-A-George/Beta Carotene) tab 1 tablet, 1 tablet, Oral, Daily    rosuvastatin (Crestor) tab 20 mg, 20 mg, Oral, Nightly    glucose (Dex4) 15 GM/59ML oral liquid 15 g, 15 g, Oral, Q15 Min PRN **OR** glucose (Glutose) 40% oral gel 15 g, 15 g, Oral, Q15 Min PRN **OR** glucose-vitamin C (Dex-4) chewable tab 4 tablet, 4 tablet, Oral, Q15 Min PRN **OR** dextrose 50% injection 50 mL, 50 mL, Intravenous, Q15 Min PRN **OR** glucose (Dex4) 15 GM/59ML oral liquid 30 g, 30 g, Oral, Q15 Min PRN **OR** glucose (Glutose) 40% oral gel 30  g, 30 g, Oral, Q15 Min PRN **OR** glucose-vitamin C (Dex-4) chewable tab 8 tablet, 8 tablet, Oral, Q15 Min PRN    acetaminophen (Tylenol Extra Strength) tab 500 mg, 500 mg, Oral, Q4H PRN    acetaminophen (Tylenol) tab 650 mg, 650 mg, Oral, Q4H PRN **OR** HYDROcodone-acetaminophen (Norco) 5-325 MG per tab 1 tablet, 1 tablet, Oral, Q4H PRN **OR** HYDROcodone-acetaminophen (Norco) 5-325 MG per tab 2 tablet, 2 tablet, Oral, Q4H PRN    melatonin tab 3 mg, 3 mg, Oral, Nightly PRN    insulin aspart (NovoLOG) 100 Units/mL FlexPen 1-5 Units, 1-5 Units, Subcutaneous, TID CC and HS

## 2025-06-08 NOTE — ED QUICK NOTES
Orders for admission, patient is aware of plan and ready to go upstairs. Any questions, please call ED RN Ronni at extension [phone].     Patient Covid vaccination status: Fully vaccinated     COVID Test Ordered in ED: None    COVID Suspicion at Admission: N/A    Running Infusions: Medication Infusions[1] None    Mental Status/LOC at time of transport: A&Ox4    Other pertinent information:   CIWA score: N/A   NIH score:  N/A             [1]

## 2025-06-08 NOTE — PLAN OF CARE
Patient is alert and oriented x4. Pain is controlled by prn medications. Vascular surgery and cardiology cleared patient for discharge. Plan is for patient to discharge home with family this afternoon.     Problem: Patient Centered Care  Goal: Patient preferences are identified and integrated in the patient's plan of care  Description: Interventions:  - What would you like us to know as we care for you? I   - Provide timely, complete, and accurate information to patient/family  - Incorporate patient and family knowledge, values, beliefs, and cultural backgrounds into the planning and delivery of care  - Encourage patient/family to participate in care and decision-making at the level they choose  - Honor patient and family perspectives and choices  Outcome: Adequate for Discharge     Problem: Patient/Family Goals  Goal: Patient/Family Long Term Goal  Description: Patient's Long Term Goal: return home    Interventions:  - vascular surgery consult  - cards consult   - See additional Care Plan goals for specific interventions  Outcome: Adequate for Discharge  Goal: Patient/Family Short Term Goal  Description: Patient's Short Term Goal: manage pain     Interventions:   - prn medications  - movement as tolerated   - See additional Care Plan goals for specific interventions  Outcome: Adequate for Discharge     Problem: CARDIOVASCULAR - ADULT  Goal: Maintains optimal cardiac output and hemodynamic stability  Description: INTERVENTIONS:  - Monitor vital signs, rhythm, and trends  - Monitor for bleeding, hypotension and signs of decreased cardiac output  - Evaluate effectiveness of vasoactive medications to optimize hemodynamic stability  - Monitor arterial and/or venous puncture sites for bleeding and/or hematoma  - Assess quality of pulses, skin color and temperature  - Assess for signs of decreased coronary artery perfusion - ex. Angina  - Evaluate fluid balance, assess for edema, trend weights  Outcome: Adequate for  Discharge  Goal: Absence of cardiac arrhythmias or at baseline  Description: INTERVENTIONS:  - Continuous cardiac monitoring, monitor vital signs, obtain 12 lead EKG if indicated  - Evaluate effectiveness of antiarrhythmic and heart rate control medications as ordered  - Initiate emergency measures for life threatening arrhythmias  - Monitor electrolytes and administer replacement therapy as ordered  Outcome: Adequate for Discharge     Problem: PAIN - ADULT  Goal: Verbalizes/displays adequate comfort level or patient's stated pain goal  Description: INTERVENTIONS:  - Encourage pt to monitor pain and request assistance  - Assess pain using appropriate pain scale  - Administer analgesics based on type and severity of pain and evaluate response  - Implement non-pharmacological measures as appropriate and evaluate response  - Consider cultural and social influences on pain and pain management  - Manage/alleviate anxiety  - Utilize distraction and/or relaxation techniques  - Monitor for opioid side effects  - Notify MD/LIP if interventions unsuccessful or patient reports new pain  - Anticipate increased pain with activity and pre-medicate as appropriate  Outcome: Adequate for Discharge     Problem: DISCHARGE PLANNING  Goal: Discharge to home or other facility with appropriate resources  Description: INTERVENTIONS:  - Identify barriers to discharge w/pt and caregiver  - Include patient/family/discharge partner in discharge planning  - Arrange for needed discharge resources and transportation as appropriate  - Identify discharge learning needs (meds, wound care, etc)  - Arrange for interpreters to assist at discharge as needed  - Consider post-discharge preferences of patient/family/discharge partner  - Complete POLST form as appropriate  - Assess patient's ability to be responsible for managing their own health  - Refer to Case Management Department for coordinating discharge planning if the patient needs post-hospital  services based on physician/LIP order or complex needs related to functional status, cognitive ability or social support system  Outcome: Adequate for Discharge

## 2025-06-08 NOTE — PLAN OF CARE
Notified trop inc 146 EKG nsr w/intraventricular block no acute changes rate 95   Bp 161/72 HR 94 pain 4/10 compared to before the norco.    Mtp 2.5 mg IV given HR and bp     230A HR 87 BP  146/60 (84)   O2 sats 97%    when awakened for vs states he has some pain mod on left side but had been sleeping

## 2025-06-08 NOTE — DISCHARGE PLANNING
Patient cleared for discharge. After visit summary given to patient. Patient and son at bedside educated on medications, follow up care and when to seek care. All current questions answered. PIV and tele monitor removed. Plan is for discharge home this afternoon with family via private vehicle. All belongings with patient.

## 2025-06-08 NOTE — H&P
[facility]  part of [facility]                                                                                                          History and Physical     Nic Leon Patient Status:  Emergency    1950 MRN [id number]   Location [facility] EMERGENCY DEPARTMENT Attending Venkat Larry MD   Hosp Day # 0 PCP No primary care provider on file.       Chief Complaint: Left-sided jaw, shoulder and knee pain.    Subjective:    Nic Leon is a 74 year old male with history of CAD with recent staged PCI, DM, HTN, HLD, right common iliac dissection, PAD s/p recent bilateral common iliac stent placement who presents to the ED today for evaluation of left-sided jaw, shoulder and knee pain.  Patient attributes jaw and shoulder pain to likely having laid on side.  Pain reproducible with movement.  Denied chest pain or shortness of breath.  Vital stable.  Labs with sodium 129,   Troponin 137--> 139  Hemoglobin 8.7  CXR suggesting increased pulmonary vascularity/LE fluid overload.  Left lower extremity Doppler showed questionable short segment nonocclusive DVT at the junction of the great saphenous and left common femoral vein.  CTA lower extremity with left external iliac dissection and a left CFA pseudoaneurysm.  No evidence of PE.  CT soft tissue neck, CT chest: No acute process    Vascular surgery, cardiology consulted.  Anti-coagulation with heparin started.  Patient will be admitted for further treatment.    History/Other:      Past Medical History:Past Medical History[1]     Past Surgical History: Past Surgical History[2]    Social History:  reports that he has quit smoking. His smoking use included cigarettes. He started smoking about 6 weeks ago. He has a 80 pack-year smoking history. He uses smokeless tobacco. He reports that he does not currently use alcohol. He reports that he does not use drugs.    Family History: Family History[3]    Allergies:  Allergies[4]    Medications:  Medications Ordered Prior to Encounter[5]    Review of Systems:   A comprehensive 14 point review of systems was completed.    Pertinent positives and negatives noted in the HPI.    Objective:     /56   Pulse 81   Temp 98.5 °F (36.9 °C) (Oral)   Resp 19   Wt 175 lb (79.4 kg)   SpO2 94%   BMI 31.00 kg/m²   General: No acute distress.    HEENT: Normocephalic, atraumatic.  Neck: Supple.  Respiratory: Normal effort.  CTAB  Cardiovascular: S1, S2 regular.  Normal rate.   Abdomen: Soft, nontender distended.  Neurologic: Alert, oriented x 3.  Nonfocal.  Musculoskeletal: No tenderness or deformity.  Extremities: No edema  Psychiatric: Appropriate    Results:      Labs:  Labs Last 24 Hours:   BMP     CBC    Other     Na 129 Cl 95 BUN 12 Glu 147   Hb 8.7   PTT 36.2 Procal -   K 4.7 CO2 24.0 Cr 1.07   WBC 7.7 >< .0  INR - CRP 5.80   Renal Lytes Endo    Hct 25.3   Trop - D dim -   eGFR - Ca 9.3 POC Gluc  -    LFT   pBNP 819 Lactic -   eGFR AA - PO4 - A1c -   AST 14 APk 64 Prot 6.5  LDL -     Mg - TSH -   ALT 10 T anna 0.4 Alb 4.3          COVID-19 Lab Results    COVID-19  Lab Results   Component Value Date    COVID19 Not Detected 11/25/2022       Pro-Calcitonin  No results for input(s): \"PCT\" in the last 168 hours.    Cardiac  Recent Labs   Lab 06/07/25  1759   PBNP 819*       Creatinine Kinase  Recent Labs   Lab 06/07/25  1759   CK 38*       Inflammatory Markers  Recent Labs   Lab 06/07/25  1759   CRP 5.80*       Imaging: Imaging data reviewed in Epic.    Assessment & Plan:      Nonocclusive left lower extremity DVT  Left lower extremity pain due to DVT  - Vascular surgery consulted  - Started on heparin drip protocol    Elevated troponin, nontrending  History of CAD s/p staged PCI complicated by right groin hematoma  - Patient denies chest pain.  No new EKG changes  - Continue DAPT  - Cardiology on consult  - Monitor on telemetry    History of PAD  - S/p recent bilateral common  iliac artery stents  - Continue antiplatelet therapy    Left jaw/shoulder pain, likely musculoskeletal  - Monitor  - Control    Hypotonic hyponatremia, chronic  - Patient reports being on fluid restriction  - Got IV fluids in the ED  - Monitor    Anemia likely due to recent blood loss  - Monitor on heparin    DM2  - Insulin while inpatient    HTN  - Resume home meds    HLD  - Statins    Quality:  DVT Prophylaxis: Heparin drip  CODE status: Full code  CHAD: TBD    Plan of care discussed with patient. Acknowledged understanding and agrees to plan. Also discussed with the ED physician.    High MDM  Time spent on this admission - examining patient, obtaining history, reviewing previous medical records, going over test results/imaging and discussing plan of care. More than 50% of the time was spent in counseling and/or coordination of care related to DVT with anemia.   All questions answered.     Priscila Mayfield MD  6/7/2025                   [1]   Past Medical History:   Atherosclerosis of coronary artery    Diabetes (HCC)    Essential hypertension    Hyperlipidemia   [2]   Past Surgical History:  Procedure Laterality Date    Drug-eluting stents, single Bilateral 05/30/2025    iliac stent    Heart surgery  05/13/2025    Cardiac stent   [3] No family history on file.  [4] No Known Allergies  [5]   Current Facility-Administered Medications on File Prior to Encounter   Medication Dose Route Frequency Provider Last Rate Last Admin    [COMPLETED] heparin (Porcine) 1000 UNIT/ML injection             [COMPLETED] lidocaine PF (Xylocaine-MPF) 2 % injection             [COMPLETED] heparin in sodium chloride 0.9% (Porcine) 2 Units/mL flush bag premix             [COMPLETED] fentaNYL (Sublimaze) 50 mcg/mL injection             [COMPLETED] midazolam (Versed) 2 MG/2ML injection             [COMPLETED] midazolam (Versed) 2 MG/2ML injection             [COMPLETED] iopamidol (ISOVUE-300) 61 % injection 100 mL  100 mL Injection ONCE PRN  Alee Washington MD   35 mL at 25 0840    [COMPLETED] protamine 10 mg/mL injection             [COMPLETED] insulin aspart (NovoLOG) 100 Units/mL FlexPen 10 Units  10 Units Subcutaneous Once Lili Paulson, DO   10 Units at 05/15/25 1320    [COMPLETED] iopamidol 76% (ISOVUE-370) injection for power injector  80 mL Intravenous ONCE PRN Lili Paulson, DO   80 mL at 05/15/25 1445    [COMPLETED] insulin aspart (NovoLOG) 100 Units/mL FlexPen 3 Units  3 Units Subcutaneous Once Lili Paulson, DO   3 Units at 25 1242    [COMPLETED] chlorhexidine (Hibiclens) 4 % external liquid   Topical On Call Francisco Lockhart MD   Given at 25 0915    [COMPLETED] sodium chloride 0.9% infusion  3 mL/kg/hr Intravenous On Call Francisco Lockhart MD   Stopped at 25 1755    [COMPLETED] heparin (Porcine) 1000 UNIT/ML injection             [COMPLETED] lidocaine PF (Xylocaine-MPF) 2 % injection             [COMPLETED] heparin in sodium chloride 0.9% (Porcine) 2 Units/mL flush bag premix             [COMPLETED] heparin in sodium chloride 0.9% (Porcine) 2 Units/mL flush bag premix             [COMPLETED] Nitroglycerin in D5W 200-5 MCG/ML-% injection             [COMPLETED] fentaNYL (Sublimaze) 50 mcg/mL injection             [COMPLETED] midazolam (Versed) 2 MG/2ML injection             [COMPLETED] midazolam (Versed) 2 MG/2ML injection             [COMPLETED] iopamidol (ISOVUE-300) 61 % injection 35 mL  35 mL Injection ONCE PRN Francisco Lockhart MD   35 mL at 25 1152    [COMPLETED] clopidogrel (Plavix) 75 MG tab             [] sodium chloride 0.9% infusion   Intravenous Continuous Francisco Lockhart MD        [COMPLETED] sodium chloride 0.9% infusion  3 mL/kg/hr Intravenous On Call Francisco Lockhart .8 mL/hr at 25 0645 3 mL/kg/hr at 25 0645    [COMPLETED] heparin (Porcine) 1000 UNIT/ML injection             [COMPLETED] verapamil (Isoptin) 2.5 mg/mL injection             [COMPLETED] lidocaine PF (Xylocaine-MPF) 2 %  injection             [COMPLETED] heparin in sodium chloride 0.9% (Porcine) 2 Units/mL flush bag premix             [COMPLETED] heparin in sodium chloride 0.9% (Porcine) 2 Units/mL flush bag premix             [COMPLETED] Nitroglycerin in D5W 200-5 MCG/ML-% injection             [COMPLETED] fentaNYL (Sublimaze) 50 mcg/mL injection             [COMPLETED] midazolam (Versed) 2 MG/2ML injection             [COMPLETED] iopamidol (ISOVUE-300) 61 % injection 20 mL  20 mL Injection ONCE PRN Francisco Lockhart MD   20 mL at 05/09/25 0822    [COMPLETED] clopidogrel (Plavix) tab 600 mg  600 mg Oral Once Francisco Lockhart MD   600 mg at 05/09/25 1122     Current Outpatient Medications on File Prior to Encounter   Medication Sig Dispense Refill    folic acid 800 MCG Oral Tab Take 1 tablet (800 mcg total) by mouth daily.      Cyanocobalamin (VITAMIN B-12) 2500 MCG Sublingual SL Tab Place 1 tablet under the tongue every other day.      Multiple Vitamins-Minerals (MULTI-VITAMIN/MINERALS) Oral Tab Take 1 tablet by mouth daily.      ferrous sulfate 325 (65 FE) MG Oral Tab EC Take 1 tablet (325 mg total) by mouth daily with breakfast. 30 tablet 0    docusate sodium 100 MG Oral Cap Take 1 capsule (100 mg total) by mouth 2 (two) times daily. 30 capsule 0    clopidogrel 75 MG Oral Tab Take 1 tablet (75 mg total) by mouth daily. 30 tablet 0    losartan 100 MG Oral Tab Take 1 tablet (100 mg total) by mouth daily.      Cholecalciferol (VITAMIN D) 25 MCG (1000 UT) Oral Tab Take 1,000 Units by mouth daily.      testosterone cypionate 200 mg/mL Intramuscular Solution Inject 1.15 mL (230 mg total) into the muscle every 14 (fourteen) days.      aspirin 81 MG Oral Tab EC Take 1 tablet (81 mg total) by mouth daily.      metFORMIN 500 MG Oral Tab Take 2 tablets (1,000 mg total) by mouth 2 (two) times daily with meals. (Patient taking differently: Take 1 tablet (500 mg total) by mouth 2 (two) times daily with meals.)      rosuvastatin 20 MG Oral Tab Take 1  tablet (20 mg total) by mouth nightly.      glipiZIDE 5 MG Oral Tab Take 1 tablet (5 mg total) by mouth 2 (two) times daily before meals.      latanoprost 0.005 % Ophthalmic Solution Place 1 drop into both eyes nightly.

## 2025-06-08 NOTE — PLAN OF CARE
Problem: Patient Centered Care  Goal: Patient preferences are identified and integrated in the patient's plan of care  Description: Interventions:  - What would you like us to know as we care for you? From home with wife  - Provide timely, complete, and accurate information to patient/family  - Incorporate patient and family knowledge, values, beliefs, and cultural backgrounds into the planning and delivery of care  - Encourage patient/family to participate in care and decision-making at the level they choose  - Honor patient and family perspectives and choices  Outcome: Progressing     Problem: Patient/Family Goals  Goal: Patient/Family Long Term Goal  Description: Patient's Long Term Goal: go home    Interventions:  - Diagnostic testing  - CT chest, CXR, CTA done  - EKG done  - Cardiology consulted  - See additional Care Plan goals for specific interventions  Outcome: Progressing  Goal: Patient/Family Short Term Goal  Description: Patient's Short Term Goal: No more chest pain    Interventions:   - PRN pain medication  - MD aware  - See additional Care Plan goals for specific interventions  Outcome: Progressing     Problem: CARDIOVASCULAR - ADULT  Goal: Maintains optimal cardiac output and hemodynamic stability  Description: INTERVENTIONS:  - Monitor vital signs, rhythm, and trends  - Monitor for bleeding, hypotension and signs of decreased cardiac output  - Evaluate effectiveness of vasoactive medications to optimize hemodynamic stability  - Monitor arterial and/or venous puncture sites for bleeding and/or hematoma  - Assess quality of pulses, skin color and temperature  - Assess for signs of decreased coronary artery perfusion - ex. Angina  - Evaluate fluid balance, assess for edema, trend weights  Outcome: Progressing  Goal: Absence of cardiac arrhythmias or at baseline  Description: INTERVENTIONS:  - Continuous cardiac monitoring, monitor vital signs, obtain 12 lead EKG if indicated  - Evaluate effectiveness  of antiarrhythmic and heart rate control medications as ordered  - Initiate emergency measures for life threatening arrhythmias  - Monitor electrolytes and administer replacement therapy as ordered  Outcome: Progressing     Problem: PAIN - ADULT  Goal: Verbalizes/displays adequate comfort level or patient's stated pain goal  Description: INTERVENTIONS:  - Encourage pt to monitor pain and request assistance  - Assess pain using appropriate pain scale  - Administer analgesics based on type and severity of pain and evaluate response  - Implement non-pharmacological measures as appropriate and evaluate response  - Consider cultural and social influences on pain and pain management  - Manage/alleviate anxiety  - Utilize distraction and/or relaxation techniques  - Monitor for opioid side effects  - Notify MD/LIP if interventions unsuccessful or patient reports new pain  - Anticipate increased pain with activity and pre-medicate as appropriate  Outcome: Progressing     Problem: DISCHARGE PLANNING  Goal: Discharge to home or other facility with appropriate resources  Description: INTERVENTIONS:  - Identify barriers to discharge w/pt and caregiver  - Include patient/family/discharge partner in discharge planning  - Arrange for needed discharge resources and transportation as appropriate  - Identify discharge learning needs (meds, wound care, etc)  - Arrange for interpreters to assist at discharge as needed  - Consider post-discharge preferences of patient/family/discharge partner  - Complete POLST form as appropriate  - Assess patient's ability to be responsible for managing their own health  - Refer to Case Management Department for coordinating discharge planning if the patient needs post-hospital services based on physician/LIP order or complex needs related to functional status, cognitive ability or social support system  Outcome: Progressing

## 2025-06-08 NOTE — ED QUICK NOTES
Rounding Completed    Plan of Care reviewed. Waiting for US/CT.  Elimination needs assessed.  Patient requesting pain medication. Will notify MD.     Bed is locked and in lowest position. Call light within reach.

## 2025-06-08 NOTE — PLAN OF CARE
Nic Leon Patient Status:  Emergency    1950 MRN [id number]   Location [facility] EMERGENCY DEPARTMENT Attending Venkat Larry MD   Hosp Day # 0 PCP No primary care provider on file.     Cardiology Nocturnal APN Note    Briefly: (Documentation from chart review)     Nic Leon is a 74 yr M  who presented with chest pain/angina  Patient had left submandibular/shoulder and LLE pain this am.   and has a PMH/PSH of:  Bicuspid aortic valv HTN CHF HL cad, recent retroperitoneal bleed s/p  bilateral common iliac angioplasty and stenting.          Past Medical History[1]    Primary Cardiologist Lockhart    Vital Signs:       2025     9:30 PM 2025     9:45 PM   Vitals History   /62 132/56   Pulse 83 81   Resp 19 19   SpO2 99 % 94 %        Labs:   Lab Results   Component Value Date    WBC 7.7 2025    HGB 8.7 2025    HCT 25.3 2025    .0 2025    CREATSERUM 1.07 2025    BUN 12 2025     2025    K 4.7 2025    CL 95 2025    CO2 24.0 2025     2025    CA 9.3 2025    ALB 4.3 2025    ALKPHO 64 2025    BILT 0.4 2025    TP 6.5 2025    AST 14 2025    ALT 10 2025    PTT 36.2 2025    ESRML 57 2025    CRP 5.80 2025    TROPHS 139 2025    CK 38 2025       Diagnostics:   US VENOUS DOPPLER LEG LEFT - DIAG IMG (CPT=93971)  Result Date: 2025  PROCEDURE: US VENOUS DOPPLER LEG LEFT-DIAG IMG (CPT=93971)  COMPARISON: None.  INDICATIONS: Eval for DVT  TECHNIQUE: Color duplex Doppler venous ultrasound of the left lower extremity was performed in the usual manner.  FINDINGS:   There is questionable short-segment nonocclusive DVT at the junction of the greater saphenous and left common femoral veins.  The remaining deep veins of the left lower extremity are widely patent with no DVT.  There are small linear areas of decreased echogenicity  anterior to the left superficial femoral artery which may represent a small perivascular hematoma.  The left superficial femoral artery is widely patent.          CONCLUSION:   1. Questionable short-segment nonocclusive DVT at the junction of the greater saphenous and left common femoral vein.  No other DVT is identified in the left lower extremity.  2. Thin linear areas of avascular decreased echogenicity/fluid anterior to the left greater saphenous artery which could represent a small perivascular hematoma.  The underlying artery is widely patent.   Dictated by (CST): Kar Tobias MD on 6/07/2025 at 8:26 PM     Finalized by (CST): Kar Tobias MD on 6/07/2025 at 8:30 PM          XR CHEST AP PORTABLE  (CPT=71045)  Result Date: 6/7/2025  PROCEDURE: XR CHEST AP PORTABLE  (CPT=71045) TIME: 18 18.   COMPARISON: None.  INDICATIONS: Elevated blood pressure and fatigue today.  TECHNIQUE:   Single view.   FINDINGS:   The heart mediastinal structures are minimally prominent.  Pulmonary vascularity is slightly increased.  The findings are suggestive of slight/early fluid overload.  Lung volumes are slightly diminished.  There is no dense consolidation, sizable effusion, or pneumothorax.           CONCLUSION: Increased pulmonary vascularity suggestive of slight/early fluid overload.    Dictated by (CST): Kar Tobias MD on 6/07/2025 at 6:27 PM     Finalized by (CST): Kar Tobias MD on 6/07/2025 at 6:28 PM          LLE   1. Questionable short-segment nonocclusive DVT at the junction of the greater saphenous and left common femoral vein.  No other DVT is identified in the left lower extremity.      2. Thin linear areas of avascular decreased echogenicity/fluid anterior to the left greater saphenous artery which could represent a small perivascular hematoma.  The underlying artery is widely patent.   CTA of L external iliac dissection and left CFA pseudoaneurysm no PE  (per hospitalist note)     Echo  5/13/2025  Findings:   Left ventricle:  The cavity size was normal. Wall thickness was normal.   Systolic function was mildly reduced. The estimated ejection fraction was   50-55%, by biplane method of disks.   Regional wall motion:   There is hypokinesis of the basal inferior and basal   inferolateral walls.   Unable to assess LV diastolic function.   Right ventricle:  The cavity size was normal.   Right atrium:  The atrium was normal in size.   Mitral valve:  The valve was structurally normal. Leaflet separation was   normal.   Aortic valve:   The valve was trileaflet. The leaflets were moderately   thickened and moderately calcified. Cusp separation was reduced. Doppler   analysis not performed due to limited study. Unable to assess degree of   aortic valve stenosis.   Tricuspid valve:  The valve is structurally normal. Leaflet separation was   normal.   Pulmonic valve:   The valve is structurally normal. Cusp separation was   normal.   Pericardium:  There was no pericardial effusion.   Pulmonary arteries:   Systolic pressure could not be accurately estimated.     Systemic veins:  Central venous respirophasic diameter changes are in the   normal range (>50%).   Inferior vena cava: The IVC was normal-sized.     5/13/2025 LHC  ([facility])   P LAD intervention        Pet stress 5/2025 in care everywhere       Allergies:  Allergies[2]    Medications:  Current Hospital Medications[3]    Assessment:   EKG shows st/t wave changes in Lead III and lateral leads dw ED physician not acute    CxR increased pulmonary vascularity suggestive of slight/early fluid overload.   Trops 137 > 139   K 4.7 crt 1.07   CRP 5.8   WBC 7.7 HH 8.7/25.3 plts 358  /56 HR 81 RR 19 O2 sats 94% on RA  Meds in ED   ml NS bolus   Asa norco given   IV heparin no bolus initiated  Patient has had lower HH since reovering from  bld last admit.  Typed and screened currently  EF 50-55% from 5/2025 echo       Plan:  Trend trops and obtain EKG if  indicated  Consider echo had one done 5/2025 above in epic   Cbc cmp mag in am   Monitor HH plts closely   Orders in sign and hold   - Continue to monitor overnight  - Formal Cardiology consult to follow in AM.       Jade Puente NP  [facility]  6/7/2025  10:27 PM         [1]   Past Medical History:   Atherosclerosis of coronary artery    Diabetes (HCC)    Essential hypertension    Hyperlipidemia   [2] No Known Allergies  [3]   ED initial dose (PE/DVT/THROMBUS) heparin    [START ON 6/8/2025] continuous dose heparin

## 2025-06-08 NOTE — PROGRESS NOTES
[facility]  part of [facility]    Progress Note    Nic Leon Patient Status:  Inpatient    1950 MRN [id number]   Location [facility] 3W/SW Attending Neftaly Frank MD   Hosp Day # 1 PCP No primary care provider on file.       Subjective:   Nic Leon is a(n) 74 year old male was seen and examined  No acute events overnight  Resting comfortably in bed, NAD  L shoulder pain persists but norco helps  No other complaints at this time  No cp, sob, f,c,n,v, abd pain or HA     Objective:   Blood pressure 138/63, pulse 85, temperature 98.2 °F (36.8 °C), temperature source Oral, resp. rate 16, weight 130 lb 9.6 oz (59.2 kg), SpO2 95%.    GENERAL:  The patient appeared to be in no distress and was comfortable.  SKIN:  Warm and hydrated  PSYCHIATRIC: Calm and cooperative    HEENT:  Head was atraumatic and normocephalic.  Eyes: Sclera was anicteric.  Pupils were equal.  Ears:  There were no lesions.  Nose:  No lesions were noted.      NECK:  Supple.  There was no JVD.    CHEST:  Symmetrical movement on inspiration  CARDIAC: S1 S2+, RRR  LUNGS:  CTAB  ABDOMEN: Non-distended, non-tender, BS+  MUSCULOSKELETAL:  There was no deformity.  There was full range of motion in all the extremities.    EXTREMITIES: There was no edema  NEUROLOGIC: No focal defecits    Current Inpatient Medications:   Current Hospital Medications[1]    Assessment and Plan:   Nonocclusive left lower extremity DVT  Left lower extremity pain due to DVT  - Vascular surgery consulted  - Started on heparin drip protocol, cont for now     Elevated troponin  History of CAD s/p staged PCI complicated by right groin hematoma  - Patient denies chest pain.  No new EKG changes  - Continue DAPT  - Cardiology on consult  - Monitor on telemetry  - will need  intervention of his circumflex and RCA disease, timing TBD     History of PAD  - S/p recent bilateral common iliac artery stents  - now with L common femoral  artery pseudoaneurysm   - Continue antiplatelet therapy  - await vascular surgery consult     Left jaw/shoulder pain, likely musculoskeletal  - Monitor  - Control  - no acute EKG changes     Hypotonic hyponatremia, chronic  - Patient reports being on fluid restriction  - Got IV fluids in the ED  - Monitor     Anemia likely due to recent blood loss  - Monitor on heparin  - hgb up trended today     DM2  - cont CF insulin  - monitor BS and adjust accordingly     HTN  - cont home meds     HLD  - Statins     Quality:  DVT Prophylaxis: Heparin drip  CODE status: Full code  CHAD: TBD    MDM: High   D/w Pt, son and RN  Results:     Recent Labs   Lab 06/07/25  1759 06/08/25  0600   RBC 2.76* 3.05*   HGB 8.7* 9.6*   HCT 25.3* 28.5*   MCV 91.7 93.4   MCH 31.5 31.5   MCHC 34.4 33.7   RDW 15.8* 15.6*   NEPRELIM 5.55 7.98*   WBC 7.7 9.7   .0 440.0         Recent Labs   Lab 06/07/25  1759 06/08/25  0600   * 219*   BUN 12 10   CREATSERUM 1.07 1.00   EGFRCR 73 79   CA 9.3 9.0   * 129*   K 4.7 4.8   CL 95* 99   CO2 24.0 22.0         Imaging:   CTA LOWER EXTREMITY BILATERAL (W+WO) (CPT=73706-50)  Result Date: 6/8/2025  CONCLUSION:  1. There is a small pseudoaneurysm at the distal aspect of the left common femoral artery, at approximately the level of the bifurcation, with surrounding hematoma formation.  2. A nonocclusive dissection of the left external iliac artery is seen extending to the left common femoral artery.  3. Bilateral common iliac artery stents are demonstrated.  4. No hemodynamically significant stenosis or occlusion of either lower extremity. Three-vessel runoff is noted.  5. Borderline prostatomegaly, paragraphs of chronic outlet obstruction.  6. Lesser incidental findings as above.    A preliminary report was issued by the [facility] teleradiology service. There are no major discrepancies.   Dictated by (CST): Carmelo Esposito MD on 6/08/2025 at 6:23 AM     Finalized by (CST): Carmelo Esposito  MD on 6/08/2025 at 6:34 AM          CT SOFT TISSUE OF NECK(CONTRAST ONLY) (CPT=70491)  Result Date: 6/8/2025  CONCLUSION:  1. Negative for acute process involving the soft tissues the neck. There is no discernible fluid collection or airway compromise.  2. Lesser incidental findings as above.    A preliminary report was issued by the [facility] teleradiology service. There are no major discrepancies.   Dictated by (CST): Carmelo Esposito MD on 6/08/2025 at 5:56 AM     Finalized by (CST): Carmelo Esposito MD on 6/08/2025 at 6:04 AM          CT CHEST PE AORTA (IV ONLY) (CPT=71260)  Result Date: 6/8/2025  CONCLUSION:  1. No evidence of acute pulmonary embolism to the level of the first order subsegmental pulmonary artery branches.  2. Pulmonary interstitial edema.  3. There is a left upper lobe peripheral nodule. The 2017 guidelines from the Fleischner Society for the follow-up and management of incidentally detected indeterminate pulmonary nodules in persons at least 35 years of age depend on nodule size (average length and width) and underlying risk factors (including smoking and other risk factors). Please consider the following recommendations after clinical assessment of risk factors.  For <6mm solid nodules: In low risk patients, no follow-up required.  If suspicious morphology or upper lobe location, consider 12 month follow-up.  In high risk patients, optional CT in 12 months.  4. Borderline perihilar lymphadenopathy.   5. Lesser incidental findings as above.   A preliminary report was issued by the [facility] teleradiology service. There are no major discrepancies.   Dictated by (CST): Carmelo Esposito MD on 6/08/2025 at 5:48 AM     Finalized by (CST): Carmelo Esposito MD on 6/08/2025 at 5:56 AM          US VENOUS DOPPLER LEG LEFT - DIAG IMG (CPT=93971)  Result Date: 6/7/2025  CONCLUSION:   1. Questionable short-segment nonocclusive DVT at the junction of the greater saphenous and left common femoral  vein.  No other DVT is identified in the left lower extremity.  2. Thin linear areas of avascular decreased echogenicity/fluid anterior to the left greater saphenous artery which could represent a small perivascular hematoma.  The underlying artery is widely patent.   Dictated by (CST): Kar Tobias MD on 6/07/2025 at 8:26 PM     Finalized by (CST): Kar Tobias MD on 6/07/2025 at 8:30 PM          XR CHEST AP PORTABLE  (CPT=71045)  Result Date: 6/7/2025  CONCLUSION: Increased pulmonary vascularity suggestive of slight/early fluid overload.    Dictated by (CST): Kar Tobias MD on 6/07/2025 at 6:27 PM     Finalized by (CST): Kar Tobias MD on 6/07/2025 at 6:28 PM          EKG 12 Lead  Result Date: 6/8/2025  Normal sinus rhythm Nonspecific intraventricular block Possible Inferior infarct (cited on or before 13-MAY-2025) Abnormal ECG When compared with ECG of 07-JUN-2025 20:14, Nonspecific intraventricular block has replaced Right bundle branch block    EKG 12 Lead  Result Date: 6/8/2025  Normal sinus rhythm Right bundle branch block Cannot rule out Inferior infarct (cited on or before 13-MAY-2025) Abnormal ECG When compared with ECG of 07-JUN-2025 17:41, No significant change was found    EKG 12 Lead  Result Date: 6/8/2025  Normal sinus rhythm Right bundle branch block Possible Inferior infarct (cited on or before 13-MAY-2025) Abnormal ECG When compared with ECG of 13-MAY-2025 14:20, No significant change was found        Neftaly Frank MD  6/8/2025          [1]   Current Facility-Administered Medications:     heparin (Porcine) 14606 units/250mL infusion PE/DVT/THROMBUS CONTINUOUS, 200-3,000 Units/hr, Intravenous, Continuous    aspirin DR tab 81 mg, 81 mg, Oral, Daily    cholecalciferol (Vitamin D3) tab 1,000 Units, 1,000 Units, Oral, Daily    clopidogrel (Plavix) tab 75 mg, 75 mg, Oral, Daily    cyanocobalamin (Vitamin B12) tab 2,500 mcg, 2,500 mcg, Oral, QOD    docusate sodium (Colace) cap 100  mg, 100 mg, Oral, BID    ferrous sulfate DR tab 325 mg, 325 mg, Oral, Daily with breakfast    folic acid (Folvite) tab 800 mcg, 800 mcg, Oral, Daily    latanoprost (Xalatan) 0.005 % ophthalmic solution 1 drop, 1 drop, Both Eyes, Nightly    losartan (Cozaar) tab 100 mg, 100 mg, Oral, Daily    multivitamin (Tab-A-George/Beta Carotene) tab 1 tablet, 1 tablet, Oral, Daily    rosuvastatin (Crestor) tab 20 mg, 20 mg, Oral, Nightly    glucose (Dex4) 15 GM/59ML oral liquid 15 g, 15 g, Oral, Q15 Min PRN **OR** glucose (Glutose) 40% oral gel 15 g, 15 g, Oral, Q15 Min PRN **OR** glucose-vitamin C (Dex-4) chewable tab 4 tablet, 4 tablet, Oral, Q15 Min PRN **OR** dextrose 50% injection 50 mL, 50 mL, Intravenous, Q15 Min PRN **OR** glucose (Dex4) 15 GM/59ML oral liquid 30 g, 30 g, Oral, Q15 Min PRN **OR** glucose (Glutose) 40% oral gel 30 g, 30 g, Oral, Q15 Min PRN **OR** glucose-vitamin C (Dex-4) chewable tab 8 tablet, 8 tablet, Oral, Q15 Min PRN    acetaminophen (Tylenol Extra Strength) tab 500 mg, 500 mg, Oral, Q4H PRN    acetaminophen (Tylenol) tab 650 mg, 650 mg, Oral, Q4H PRN **OR** HYDROcodone-acetaminophen (Norco) 5-325 MG per tab 1 tablet, 1 tablet, Oral, Q4H PRN **OR** HYDROcodone-acetaminophen (Norco) 5-325 MG per tab 2 tablet, 2 tablet, Oral, Q4H PRN    melatonin tab 3 mg, 3 mg, Oral, Nightly PRN    insulin aspart (NovoLOG) 100 Units/mL FlexPen 1-5 Units, 1-5 Units, Subcutaneous, TID CC and HS

## 2025-06-08 NOTE — DISCHARGE SUMMARY
[facility]  part of [facility]    Discharge Summary    Nic Leon Patient Status:  Inpatient    1950 MRN [id number]   Location [facility] 3W/SW Attending Neftaly Frank MD   Hosp Day # 1 PCP No primary care provider on file.     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    TCM Follow-Up Recommendation:  LACE > 58: High Risk of readmission after discharge from the hospital.    Problem List: Problem List[1]    Reason for Admission: Left-sided jaw, shoulder and knee pain.     Physical Exam:   Please see progress note from 25    History of Present Illness:   Per Dr. Mayfield  Nic Leon is a 74 year old male with history of CAD with recent staged PCI, DM, HTN, HLD, right common iliac dissection, PAD s/p recent bilateral common iliac stent placement who presents to the ED today for evaluation of left-sided jaw, shoulder and knee pain.  Patient attributes jaw and shoulder pain to likely having laid on side.  Pain reproducible with movement.  Denied chest pain or shortness of breath.  Vital stable.  Labs with sodium 129,   Troponin 137--> 139  Hemoglobin 8.7  CXR suggesting increased pulmonary vascularity/LE fluid overload.  Left lower extremity Doppler showed questionable short segment nonocclusive DVT at the junction of the great saphenous and left common femoral vein.  CTA lower extremity with left external iliac dissection and a left CFA pseudoaneurysm.  No evidence of PE.  CT soft tissue  neck, CT chest: No acute process     Vascular surgery, cardiology consulted.  Anti-coagulation with heparin started.  Patient will be admitted for further treatment.    Hospital Course:   Nonocclusive left lower extremity DVT  Left lower extremity pain due to DVT  - Vascular surgery consulted  - Started on heparin drip protocol however after evaluation by vascular surgery, this is most likely superficial thrombophlebitis   - will dc AC  - will repeat US as OP     Elevated troponin  History of CAD s/p staged PCI complicated by right groin hematoma  - Patient denies chest pain.  No new EKG changes  - Continue DAPT  - Cardiology was on consult  - Monitor on telemetry  - will need  intervention of his circumflex and RCA disease, timing TBD  - trops down trending, stable for dc      History of PAD  - S/p recent bilateral common iliac artery stents  - now with L common femoral artery pseudoaneurysm   - Continue antiplatelet therapy  - vascular surgery has evaluated, stable for dc, will obtain repeat US as OP     Left jaw/shoulder pain, likely musculoskeletal  - cont tramadol     Hypotonic hyponatremia, chronic  - cont to monitor as OP     Anemia likely due to recent blood loss  - cont to monitor      DM2  - cont home meds     HTN  - cont home meds     HLD  - Statins       Consultations: cardiology, vascular surgery    Procedures: none    Complications: none    Discharge Condition: Stable    Discharge Medications:      Discharge Medications        START taking these medications        Instructions Prescription details   traMADol 50 MG Tabs  Commonly known as: Ultram      Take 1 tablet (50 mg total) by mouth every 6 (six) hours as needed.   Quantity: 20 tablet  Refills: 0            CONTINUE taking these medications        Instructions Prescription details   aspirin 81 MG Tbec      Take 1 tablet (81 mg total) by mouth daily.   Refills: 0     clopidogrel 75 MG Tabs  Commonly known as: Plavix      Take 1 tablet (75 mg  total) by mouth daily.   Quantity: 30 tablet  Refills: 0     docusate sodium 100 MG Caps  Commonly known as: Colace      Take 1 capsule (100 mg total) by mouth 2 (two) times daily.   Quantity: 30 capsule  Refills: 0     ferrous sulfate 325 (65 FE) MG Tbec      Take 1 tablet (325 mg total) by mouth daily with breakfast.   Quantity: 30 tablet  Refills: 0     folic acid 800 MCG Tabs  Commonly known as: FOLVITE      Take 1 tablet (800 mcg total) by mouth daily.   Refills: 0     glipiZIDE 5 MG Tabs  Commonly known as: Glucotrol      Take 1 tablet (5 mg total) by mouth 2 (two) times daily before meals.   Refills: 0     latanoprost 0.005 % Soln  Commonly known as: Xalatan      Place 1 drop into both eyes nightly.   Refills: 0     losartan 100 MG Tabs  Commonly known as: Cozaar      Take 1 tablet (100 mg total) by mouth daily.   Refills: 0     metFORMIN 500 MG Tabs  Commonly known as: Glucophage      Take 2 tablets (1,000 mg total) by mouth 2 (two) times daily with meals.   Refills: 0     Multi-Vitamin/Minerals Tabs      Take 1 tablet by mouth daily.   Refills: 0     rosuvastatin 20 MG Tabs  Commonly known as: Crestor      Take 1 tablet (20 mg total) by mouth nightly.   Refills: 0     testosterone cypionate 200 mg/mL Soln  Commonly known as: Depo-Testosterone      Inject 1.15 mL (230 mg total) into the muscle every 14 (fourteen) days.   Refills: 0     Vitamin B-12 2500 MCG Subl      Place 1 tablet under the tongue every other day.   Refills: 0     Vitamin D 25 MCG (1000 UT) Tabs      Take 1,000 Units by mouth daily.   Refills: 0               Where to Get Your Medications        These medications were sent to [id number] - [facility] [address] AT [facility], [phone], [phone] [address] [address]      Phone: [phone]   traMADol 50 MG Tabs         Greater than 35 minutes spent, >50% spent counseling re: treatment plan and workup     Neftaly SUMMERS  MD Monika  6/8/2025           [1]   Patient Active Problem List  Diagnosis    CAD (coronary artery disease)    Diabetes 1.5, managed as type 2 (HCC)    Hypervolemia, unspecified hypervolemia type    Elevated troponin    Hyponatremia    Iliac artery dissection (HCC)    Femoral artery pseudo-aneurysm, left

## 2025-06-08 NOTE — CONSULTS
Vascular Surgery Consult Note     Name: Nic Leon  MRN: [id number]  : 1950    HPI:   74-year-old male with history of PCI via right common femoral access complicate by common iliac artery dissection requiring bilateral common iliac artery stenting with Dr. Washington 25. He presented to the ED yesterday with left shoulder, jaw, and knee pain. Workup demonstrated a small left common femoral artery pseudoaneurysm, left external iliac artery dissection, and possibly a \"questionable short-segment nonocclusive DVT at the junction of the greater saphenous and left common femoral vein.\" He was placed on heparin and admitted for observation. On my exam today, he denies any lower extremity complaints. He has bilateral pedal pulses. Left groin has a small hematoma, but no pulsatile mass. He is maintained on aspirin, plavix, and statin otherwise as an outpatient.     ROS:  A comprehensive 10 point review of systems was completed.  Pertinent positives and negatives noted in the the HPI.    Past Medical History[1]  Past Surgical History[2]    Physical Examination  /63 (BP Location: Right arm)   Pulse 85   Temp 98.2 °F (36.8 °C) (Oral)   Resp 16   Wt 130 lb 9.6 oz (59.2 kg)   SpO2 95%   BMI 23.13 kg/m²     Constitutional Well developed well-nourished no acute distress   Neuro Alert & oriented x 3   Cardiovascular Regular rate and rhythm   Respiratory Normal work of breathing   GI Abdomen soft, nontender, nondistended   Skin Warm, dry, and intact     Incision Left groin with small hematoma, no erythema or signs of infection      Pulse Exam  Right Left   PT pulse PT pulse     Imaging:   CTA and duplex images personally reviewed    Assessment & Plan:  74-year-old male s/p bilateral iliac stenting  found to have a small left common femoral pseudoaneurysm, left external iliac artery dissection, and possibly thrombus in the left GSV. These are all incidental findings as his presenting symptoms were  shoulder and jaw pain. The pseudoaneurysm is 4mm and should resolve with conservative management alone. In review of the duplex images, I see no definitive evidence of DVT. Thrombus in the GSV is considered superficial thrombophlebitis, which does not require anticoagulation and is treated with warm compresses and NSAIDs. The left external iliac artery dissection is non-flow-limiting and therefore does not require treatment. Okay for discharge on aspirin/plavix alone from my standpoint. We will schedule short interval follow up this week with venous and arterial duplex to evaluate for DVT/resolution of the small pseudoaneurysm.     Sejal Webb MD  [facility]  Vascular Surgery       [1]   Past Medical History:   Atherosclerosis of coronary artery    Diabetes (HCC)    Essential hypertension    Hyperlipidemia   [2]   Past Surgical History:  Procedure Laterality Date    Drug-eluting stents, single Bilateral 05/30/2025    iliac stent    Heart surgery  05/13/2025    Cardiac stent

## 2025-06-08 NOTE — ED QUICK NOTES
Patient stable for transfer to floor at this time. A&Ox4, skin p/w/d. Denies cp/elvin. Pain managed. Iv site patent and intact. All belongings accompanying patient to floor.

## 2025-06-08 NOTE — HISTORICAL OFFICE NOTE
[facility]  [address], [address] [place] [address]  [phone]      Nic Leon  Progress Note  Demographics:  Name: Nic Leon YOB: 1950  Age: 74, Male Medical Record No: [id number]  Visited Date/Time: 05/20/2025 10:30 AM    Chief Complaints  Hospital follow up   angio done by  right groin approach  History of Present Illness  74-year-old male being followed for coronary calcification, hyperlipidemia, uncontrolled hypertension, HFrEF (echocardiogram [place] 2024 EF 50%).  He does get short of breath with mild to moderate exertion, has orthopedic issues as well.  No chest pain with exertion.  No palpitations or syncope.  No lower extremity edema, orthopnea or PND.    May 20 patient comes in today for a follow-up after procedure.  He had shockwave PCI to the proximal LAD with 1 stent the recommendation was to start cardiac rehab plan is for  circumflex and RCA in June.  He states he also saw vascular who is planning on putting some stents in his iliac arteries as well this is going to be on May 30.  He continues with Plavix and aspirin.  His hemoglobin did drop he had a hematoma during his hospitalization but is hemoglobin went from 7.9 up to 8.8 trending better.  He did stop smoking April 21.  His last LDL was 38.  Groin site is healing well extensive bruising down the leg and down the hip and groin area but soft no swelling no pain.  Cardiac risk factors Former smoker  Past Medical History  1.Pre-operative laboratory examination  2.Pre-op testing  3.Abnormal nuclear stress test  4.Hyperlipidemia, mixed  5.Heart failure with reduced ejection fraction  6.Hypertension (HTN), primary  Family History  1. Son - Heart attack  2. Brother - Hypertension (HTN), primary  Social History  Smoking status Former smoker  Tobacco usage - No (Ex-smoker (finding))  Review of systems  Cardiovascular No history of Chest pain, ORSE, Palpitations, Syncope, PND,  Orthopnea, Edema and Claudication  Respiratory No history of SOB, Wheezing and Sputum  Hem/Lymphatic No history of Easy bruising, Blood clots, Hx of blood transfusion, Anemia and Bleeding problems  Physical Examination  Constitutional 98%o2  Vitals Left Arm Sitting  / 70 mmHg, Pulse rate 80 bpm, Height in 5' 3\", BMI: 23.2, Weight in 131 lbs (or) 59 kgs and BSA : 1.63 cc/m²  General Appearance Appropriate  Respiratory Lungs clear with normal breath sounds  Cardiovascular Normal and normal S1 and S2    Lower Extremities No edema  Allergies  No known medication allergies.  Medications (Info obtained by: Verbal)  1.aspirin 81 mg tablet,delayed release, Take 1 tablet orally once a day.  2.clopidogreL 75 mg tablet, Take 1 tablet orally once a day.  3.glipiZIDE 2.5 mg tablet, Take 1 tablet orally once a day.  4.losartan 100 mg tablet, Take 1 tablet orally once a day.  5.metFORMIN 500 mg tablet, Take 1 tablet orally 2 times a day.  6.rosuvastatin 20 mg tablet, Take 1 tablet orally once a day.  Impression  1.Abnormal nuclear stress test  2.Hypertension (HTN), primary  Assessment & Plan  Coronary artery disease multivessel had shockwave PCI to the proximal LAD with 1 stent placed with the plan for  circumflex RCA on June.24.  Patient continues with Plavix and aspirin understands not to interrupt therapy.  Eventually go to cardiac rehab after interventions.    Peripheral vascular disease patient has [facility] and is anticipating PCI of the iliacs May 30.     Hyperlipidemia LDL is 38 he is on rosuvastatin 20 mg.    Hyponatremia his sodium level was mildly low at 130 will repeat a BMP in 1 to 2 weeks.  He is not on diuretics.  Advised to have some Gatorade    Patient will follow-up in July after his procedures.  Labs and Diagnostics ordered  1.BMP (Basic Metabolic Panel) (2 Weeks)  Future appointments  1.Referral Visit - Candy Samuels ([email or web address]) :  (Today)  Miscellaneous  1.Weight monitoring (regime/therapy)  Nurses documentation  Upcoming surgeries: None  Use of assistive device(s): None  Refills: None   Patient instructions  1. cardiac rehab  2.  of circ and RCA June 24  3. BMP in 1-2 weeks  4.   Please bring in you medication bottles or updated medicine list to your next appointment.   Call [facility] if you have any problems or concerns at [phone]     Lab Details  CBC, PLATELET; NO DIFFERENTIAL  05/14/2025 08:15:09 AM  WBC 6.2 4.0-11.0 x10(3) uL  F  RED BLOOD COUNT 2.69 3.80-5.80 x10(6)uL L F  HGB 8.3 13.0-17.5 g/dL L F  HCT 24.7 39.0-53.0 % L F  MEAN CELL VOLUME 91.8 80.0-100.0 fL  F  MEAN CORPUSCULAR HEMOGLOBIN 30.9 26.0-34.0 pg  F  MEAN CORPUSCULAR HGB CONC 33.6 31.0-37.0 g/dL  F  RED CELL DISTRIBUTION WIDTH CV 12.4 11.0-15.0 %  F  RED CELL DISTRIBUTION WIDTH-SD 41.0 35.1-46.3 fL  F  PLATELETS 104.0 150.0-450.0 10(3)uL L F  IMMATURE PLATELET FRACTION 3.9 0.0-7.0 %  F  BASIC METABOLIC PANEL (8)  05/14/2025 07:59:15 AM  GLUCOSE 216 70-99 mg/dL H F  SODIUM 132 136-145 mmol/L L F  POTASSIUM 5.3 3.5-5.1 mmol/L H F  CHLORIDE 104  mmol/L  F  CO2 22.0 21.0-32.0 mmol/L  F  ANION GAP 6 0-18 mmol/L  F  BUN 8 9-23 mg/dL L F  CREATININE 1.00 0.70-1.30 mg/dL  F  BUN/ CREAT RATIO 8.0 10.0-20.0 L F  CALCIUM 8.3 8.7-10.4 mg/dL L F  OSMOLALITY CALCULATED 279 275-295 mOsm/kg  F  E GFR CR 79 >=60 mL/min/1.73m2  F  POCT GLUCOSE  05/13/2025 10:32:16 PM  POCT GLUCOSE 336 70-99 mg/dL H F  CBC WITH DIFFERENTIAL WITH PLATELET  05/11/2025 10:51:19 AM  WBC 6.6 4.0-11.0 x10(3) uL  F  RED BLOOD COUNT 3.76 3.80-5.80 x10(6)uL L F  HGB 11.6 13.0-17.5 g/dL L F  HCT 33.0 39.0-53.0 % L F  MEAN CELL VOLUME 87.8 80.0-100.0 fL  F  MEAN CORPUSCULAR HEMOGLOBIN 30.9 26.0-34.0 pg  F  MEAN CORPUSCULAR HGB CONC 35.2 31.0-37.0 g/dL  F  RED CELL DISTRIBUTION WIDTH-SD 38.8 35.1-46.3 fL  F  RED CELL DISTRIBUTION WIDTH CV 11.9 11.0-15.0 %  F  PLATELETS 276.0 150.0-450.0 10(3)uL  F  NEUTROPHIL  ABS PRELIM 4.53 1.50-7.70 x10 (3) uL  F  NEUTROPHIL ABSOLUTE 4.53 1.50-7.70 x10(3) uL  F  LYMPHOCYTE ABSOLUTE 1.33 1.00-4.00 x10(3) uL  F  MONOCYTE ABSOLUTE 0.52 0.10-1.00 x10(3) uL  F  EOSINOPHIL ABSOLUTE 0.15 0.00-0.70 x10(3) uL  F  BASOPHIL ABSOLUTE 0.05 0.00-0.20 x10(3) uL  F  IMMATURE GRANULOCYTE COUNT 0.02 0.00-1.00 x10(3) uL  F  NEUTROPHIL % 68.5 %  F  LYMPHOCYTE % 20.2 %  F  MONOCYTE % 7.9 %  F  EOSINOPHIL % 2.3 %  F  BASOPHIL % 0.8 %  F  IMMATURE GRANULOCYTE RATIO % 0.3 %  F  APOLIPOPROTEIN B  04/25/2025 04:06:00 AM  LC APOLIPOPROTEIN B 59 <90 mg/dL  F  LIPOPROTEIN (A)  04/23/2025 01:07:00 PM  LC LIPOPROTEIN (A) 231.4 <75.0 nmol/L H F  Diagnostics Details  Nuclear PET 05/05/2025  1.Stress EKG is normal.    2.Pt denied chest pain.    3.Rare PVC\"s.    1.This is an abnormal perfusion study. Significant aortic and coronary calcium.    2.This scan is suggestive of moderate risk for future cardiovascular events.    3.Medium perfusion abnormality of mild intensity in the inferior lateral region which is more pronounced on rest images.    4.The left ventricular cavity is noted to be normal on the stress studies. The stress left ventricular ejection fraction was calculated to be 42% and left ventricular global function is mildly reduced. The rest left ventricular cavity is noted to be normal. The rest left ventricular ejection fraction was calculated to be 37% and rest left ventricular global function is moderately reduced.    5.When compared to the resting ejection fraction (37%), the stress ejection fraction (42%) has increased.    6.The study quality is excellent.    Trans Thoracic Echocardiogram 05/01/2025  1.The study quality is average.    2.The left ventricle is normal in size and wall thickness is within normal limits however LV mildly dilated. Global left ventricular systolic function is normal. The left ventricular ejection fraction is 55-60%. The left ventricle diastolic function is impaired (Grade I)  with normal left atrial pressure. No regional wall motion abnormality is noted.    3.Moderate aortic valve stenosis, valve is bicuspid. The trans-aortic peak velocity is 2.8 m/s. The trans-aortic mean gradient is 16.0 mmHg. DI: 0.30 Mild (1+) aortic regurgitation.    CPOE Orders carried out by: Lorri Alford  Care Providers: Aida JANSEN and Lorri Alford  Electronically Authenticated by  Aida JANSEN  05/20/2025 02:29:06 PM  Disclaimer: Components of this note were documented using voice recognition system and are subject to errors not corrected at proofreading. Contact the author of this note for any clarifications.

## 2025-06-09 LAB
ATRIAL RATE: 86 BPM
ATRIAL RATE: 91 BPM
ATRIAL RATE: 95 BPM
P AXIS: 46 DEGREES
P AXIS: 58 DEGREES
P AXIS: 68 DEGREES
P-R INTERVAL: 146 MS
P-R INTERVAL: 152 MS
P-R INTERVAL: 172 MS
Q-T INTERVAL: 362 MS
Q-T INTERVAL: 374 MS
Q-T INTERVAL: 378 MS
QRS DURATION: 138 MS
QRS DURATION: 140 MS
QRS DURATION: 140 MS
QTC CALCULATION (BEZET): 445 MS
QTC CALCULATION (BEZET): 447 MS
QTC CALCULATION (BEZET): 475 MS
R AXIS: 50 DEGREES
R AXIS: 56 DEGREES
R AXIS: 58 DEGREES
T AXIS: 38 DEGREES
T AXIS: 46 DEGREES
T AXIS: 50 DEGREES
VENTRICULAR RATE: 86 BPM
VENTRICULAR RATE: 91 BPM
VENTRICULAR RATE: 95 BPM

## 2025-06-24 ENCOUNTER — HOSPITAL ENCOUNTER (OUTPATIENT)
Dept: INTERVENTIONAL RADIOLOGY/VASCULAR | Facility: HOSPITAL | Age: 75
Discharge: HOME | End: 2025-06-24
Attending: INTERNAL MEDICINE
Payer: MEDICARE

## 2025-07-10 VITALS — BODY MASS INDEX: 23.04 KG/M2 | HEIGHT: 63 IN | WEIGHT: 130 LBS

## 2025-07-10 RX ORDER — SODIUM CHLORIDE 9 MG/ML
3 INJECTION, SOLUTION INTRAVENOUS
OUTPATIENT
Start: 2025-07-11 | End: 2025-07-11

## 2025-07-10 RX ORDER — ASPIRIN 81 MG/1
324 TABLET, CHEWABLE ORAL ONCE
OUTPATIENT
Start: 2025-07-10 | End: 2025-07-10

## 2025-07-10 RX ORDER — CHLORHEXIDINE GLUCONATE 40 MG/ML
SOLUTION TOPICAL
OUTPATIENT
Start: 2025-07-11 | End: 2025-07-11

## 2025-07-24 ENCOUNTER — HOSPITAL ENCOUNTER (OUTPATIENT)
Dept: INTERVENTIONAL RADIOLOGY/VASCULAR | Facility: HOSPITAL | Age: 75
Discharge: HOME | End: 2025-07-24
Attending: INTERNAL MEDICINE
Payer: MEDICARE

## 2025-08-11 ENCOUNTER — LAB ENCOUNTER (OUTPATIENT)
Dept: LAB | Facility: HOSPITAL | Age: 75
End: 2025-08-11
Attending: INTERNAL MEDICINE

## 2025-08-11 DIAGNOSIS — I50.20 SYSTOLIC HEART FAILURE (HCC): Primary | ICD-10-CM

## 2025-08-11 LAB
ANION GAP SERPL CALC-SCNC: 7 MMOL/L (ref 0–18)
BASOPHILS # BLD AUTO: 0.03 X10(3) UL (ref 0–0.2)
BASOPHILS NFR BLD AUTO: 0.5 %
BUN BLD-MCNC: 13 MG/DL (ref 9–23)
BUN/CREAT SERPL: 12.7 (ref 10–20)
CALCIUM BLD-MCNC: 9 MG/DL (ref 8.7–10.4)
CHLORIDE SERPL-SCNC: 96 MMOL/L (ref 98–112)
CO2 SERPL-SCNC: 24 MMOL/L (ref 21–32)
CREAT BLD-MCNC: 1.02 MG/DL (ref 0.7–1.3)
DEPRECATED RDW RBC AUTO: 41.9 FL (ref 35.1–46.3)
EGFRCR SERPLBLD CKD-EPI 2021: 77 ML/MIN/1.73M2 (ref 60–?)
EOSINOPHIL # BLD AUTO: 0.1 X10(3) UL (ref 0–0.7)
EOSINOPHIL NFR BLD AUTO: 1.6 %
ERYTHROCYTE [DISTWIDTH] IN BLOOD BY AUTOMATED COUNT: 13.2 % (ref 11–15)
FASTING STATUS PATIENT QL REPORTED: NO
GLUCOSE BLD-MCNC: 244 MG/DL (ref 70–99)
HCT VFR BLD AUTO: 28.7 % (ref 39–53)
HGB BLD-MCNC: 10.2 G/DL (ref 13–17.5)
IMM GRANULOCYTES # BLD AUTO: 0.01 X10(3) UL (ref 0–1)
IMM GRANULOCYTES NFR BLD: 0.2 %
LYMPHOCYTES # BLD AUTO: 1.36 X10(3) UL (ref 1–4)
LYMPHOCYTES NFR BLD AUTO: 21.4 %
MCH RBC QN AUTO: 31 PG (ref 26–34)
MCHC RBC AUTO-ENTMCNC: 35.5 G/DL (ref 31–37)
MCV RBC AUTO: 87.2 FL (ref 80–100)
MONOCYTES # BLD AUTO: 0.68 X10(3) UL (ref 0.1–1)
MONOCYTES NFR BLD AUTO: 10.7 %
NEUTROPHILS # BLD AUTO: 4.19 X10 (3) UL (ref 1.5–7.7)
NEUTROPHILS # BLD AUTO: 4.19 X10(3) UL (ref 1.5–7.7)
NEUTROPHILS NFR BLD AUTO: 65.6 %
OSMOLALITY SERPL CALC.SUM OF ELEC: 272 MOSM/KG (ref 275–295)
PLATELET # BLD AUTO: 263 10(3)UL (ref 150–450)
POTASSIUM SERPL-SCNC: 4 MMOL/L (ref 3.5–5.1)
RBC # BLD AUTO: 3.29 X10(6)UL (ref 3.8–5.8)
SODIUM SERPL-SCNC: 127 MMOL/L (ref 136–145)
WBC # BLD AUTO: 6.4 X10(3) UL (ref 4–11)

## 2025-08-11 PROCEDURE — 85025 COMPLETE CBC W/AUTO DIFF WBC: CPT

## 2025-08-11 PROCEDURE — 80048 BASIC METABOLIC PNL TOTAL CA: CPT

## 2025-08-11 PROCEDURE — 36415 COLL VENOUS BLD VENIPUNCTURE: CPT
